# Patient Record
Sex: MALE | Race: WHITE | NOT HISPANIC OR LATINO | Employment: OTHER | URBAN - NONMETROPOLITAN AREA
[De-identification: names, ages, dates, MRNs, and addresses within clinical notes are randomized per-mention and may not be internally consistent; named-entity substitution may affect disease eponyms.]

---

## 2017-07-20 ENCOUNTER — OFFICE VISIT (OUTPATIENT)
Dept: PODIATRY | Facility: CLINIC | Age: 62
End: 2017-07-20

## 2017-07-20 VITALS — BODY MASS INDEX: 27.09 KG/M2 | WEIGHT: 200 LBS | HEIGHT: 72 IN

## 2017-07-20 DIAGNOSIS — M72.2 PLANTAR FASCIITIS: ICD-10-CM

## 2017-07-20 DIAGNOSIS — M24.573 EQUINUS CONTRACTURE OF ANKLE: ICD-10-CM

## 2017-07-20 DIAGNOSIS — M79.672 LEFT FOOT PAIN: Primary | ICD-10-CM

## 2017-07-20 PROCEDURE — 99203 OFFICE O/P NEW LOW 30 MIN: CPT | Performed by: PODIATRIST

## 2017-07-20 PROCEDURE — 20550 NJX 1 TENDON SHEATH/LIGAMENT: CPT | Performed by: PODIATRIST

## 2017-07-20 RX ORDER — MELOXICAM 15 MG/1
15 TABLET ORAL DAILY
Qty: 30 TABLET | Refills: 0 | Status: SHIPPED | OUTPATIENT
Start: 2017-07-20 | End: 2019-07-31

## 2017-07-20 RX ORDER — NAPROXEN 500 MG/1
TABLET ORAL
Refills: 11 | COMMUNITY
Start: 2017-07-10 | End: 2017-07-20

## 2017-07-20 RX ADMIN — BETAMETHASONE SODIUM PHOSPHATE AND BETAMETHASONE ACETATE 6 MG: 3; 3 INJECTION, SUSPENSION INTRA-ARTICULAR; INTRALESIONAL; INTRAMUSCULAR; SOFT TISSUE at 10:35

## 2017-07-20 NOTE — PROGRESS NOTES
"Brandin Jackson  1955  61 y.o. male  Patient presents today for left foot pain.    07/20/2017  Chief Complaint   Patient presents with   • Left Foot - Pain           History of Present Illness    Brandin Jackson is a 61 y.o. male who presents for evaluation of left plantar foot pain.  He states his pain is been present for several months ever since he underwent a left total knee arthroplasty.  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  He denies any trauma or injuries.  He describes the pain as sharp and worse with any kind of weightbearing.  He states he has been stretching it a little and taking naproxen.  He notes minimal relief of symptoms with these treatments.    No past medical history on file.      No past surgical history on file.      No family history on file.      Social History     Social History   • Marital status:      Spouse name: N/A   • Number of children: N/A   • Years of education: N/A     Occupational History   • Not on file.     Social History Main Topics   • Smoking status: Current Every Day Smoker   • Smokeless tobacco: Not on file   • Alcohol use No   • Drug use: Not on file   • Sexual activity: Not on file     Other Topics Concern   • Not on file     Social History Narrative   • No narrative on file         Current Outpatient Prescriptions   Medication Sig Dispense Refill   • meloxicam (MOBIC) 15 MG tablet Take 1 tablet by mouth Daily. Take once daily. 30 tablet 0     No current facility-administered medications for this visit.          OBJECTIVE    Ht 72\" (182.9 cm)  Wt 200 lb (90.7 kg)  BMI 27.12 kg/m2      Review of Systems   Constitutional:  Denies recent weight loss, weight gain, fever or chills, no change in exercise tolerance  Musculoskeletal: Arch/foot pain.   Skin: No wounds or lesions  Neurological: Denies paresthesias.  Psychiatric/Behavioral: Denies depression        Physical Exam   Constitutional: he appears " well-developed and well-nourished.   HEENT: Normocephalic. Atraumatic.  CV: No CP. RRR  Resp: Non-labored respirations.  Psychiatric: he has a normal mood and affect. his behavior is normal.         Lower Extremity Exam:  Vascular: DP/PT pulses palpable 2+.   Minimal plantar heel edema, left  Foot warm  Neuro: Protective sensation intact, b/l.  DTRs intact  Negative Tinel over tarsal tunnel  Integument: No open wounds or lesions.  No erythema, scaling  No masses  Musculoskeletal: LE muscle strength 5/5.   Gait normal  Ankle ROM decreased without pain or crepitus  STJ ROM full without pain or crepitus  Minimal pain on palpation of plantar calcaneal tubercles, left  Taut medial plantar fascial band noted with pain on palpation, left  No tenderness to lateral compression of calcaneus, left    Left foot radiographs- Normal osseous density. No acute fractures, subluxations or erosions.    Left heel injection:  Risks and benefits discussed. Written consent obtained.  Skin prepped with alcohol  Medial heel injection 1cc 0.5% Marcaine, 1cc Celestone with 27g needle  Band-aid applied.  Pt tolerated well.          ASSESSMENT AND PLAN    Brandin was seen today for pain.    Diagnoses and all orders for this visit:    Left foot pain  -     XR Foot 3+ View Left    Plantar fasciitis    Equinus contracture of ankle    Other orders  -     meloxicam (MOBIC) 15 MG tablet; Take 1 tablet by mouth Daily. Take once daily.    -Comprehensive foot and ankle exam performed  -Radiographs ordered and reviewed  -Educated pt on diagnosis, etiology and treatment of plantar fasciitis.  -Continue motion control shoe  -Recommend power step over-the-counter inserts  -Aggressive stretching regimen, patient education materials dispensed  -Rx Meloxicam 15 mg qday, not to be taken with other NSAIDs  -Corticosteroid injection as above  -Patient does have very taut plantar fascia on the left foot.  Did briefly discuss possibility of surgical release if  conservative therapies fail  -Recheck 4 weeks          This document has been electronically signed by Edwardo Garza DPM on July 23, 2017 1:51 PM     EMR Dragon/Transcription disclaimer:   Much of this encounter note is an electronic transcription/translation of spoken language to printed text. The electronic translation of spoken language may permit erroneous, or at times, nonsensical words or phrases to be inadvertently transcribed; Although I have reviewed the note for such errors, some may still exist.    Edwardo Garza DPM  7/23/2017  1:51 PM

## 2017-07-26 RX ORDER — BETAMETHASONE SODIUM PHOSPHATE AND BETAMETHASONE ACETATE 3; 3 MG/ML; MG/ML
6 INJECTION, SUSPENSION INTRA-ARTICULAR; INTRALESIONAL; INTRAMUSCULAR; SOFT TISSUE ONCE
Status: COMPLETED | OUTPATIENT
Start: 2017-07-26 | End: 2017-07-20

## 2018-02-07 ENCOUNTER — TRANSCRIBE ORDERS (OUTPATIENT)
Dept: PHYSICAL THERAPY | Facility: HOSPITAL | Age: 63
End: 2018-02-07

## 2018-02-07 DIAGNOSIS — Z96.651 PRESENCE OF RIGHT ARTIFICIAL KNEE JOINT: ICD-10-CM

## 2018-02-07 DIAGNOSIS — M17.11 ARTHRITIS OF RIGHT KNEE: Primary | ICD-10-CM

## 2018-02-08 ENCOUNTER — DOCUMENTATION (OUTPATIENT)
Dept: PHYSICAL THERAPY | Facility: HOSPITAL | Age: 63
End: 2018-02-08

## 2018-02-08 ENCOUNTER — HOSPITAL ENCOUNTER (OUTPATIENT)
Dept: PHYSICAL THERAPY | Facility: HOSPITAL | Age: 63
Setting detail: THERAPIES SERIES
Discharge: HOME | End: 2018-02-08

## 2018-02-08 DIAGNOSIS — Z96.651 S/P TOTAL KNEE ARTHROPLASTY, RIGHT: ICD-10-CM

## 2018-02-08 DIAGNOSIS — M17.11 PRIMARY OSTEOARTHRITIS OF RIGHT KNEE: Primary | ICD-10-CM

## 2018-02-08 PROCEDURE — G0283 ELEC STIM OTHER THAN WOUND: HCPCS | Performed by: PHYSICAL THERAPIST

## 2018-02-08 PROCEDURE — 97162 PT EVAL MOD COMPLEX 30 MIN: CPT | Performed by: PHYSICAL THERAPIST

## 2018-02-08 PROCEDURE — 97110 THERAPEUTIC EXERCISES: CPT | Performed by: PHYSICAL THERAPIST

## 2018-02-08 NOTE — THERAPY EVALUATION
"    Outpatient Physical Therapy Ortho Initial Evaluation  [facility]     Patient Name: Brandin Jackson  : 1955  MRN: [id number]  Today's Date: 2018      Visit Date: 2018  Attendance:  (60 visits/yr)  Subjective Improvement: n/a  Next MD Appt: next week  Recert Date: 3/1/18    Therapy Diagnosis: R TKA 18         No past medical history on file.     Past Surgical History:   Procedure Laterality Date   • KIDNEY STONE SURGERY     • TOTAL KNEE ARTHROPLASTY Left 2017       Current Outpatient Prescriptions:  · Percocet  · Eliquis  · Colace  · Toradol  · Oxycontin        Allergies   Allergen Reactions   • Penicillins Swelling     Itching         Visit Dx:     ICD-10-CM ICD-9-CM   1. Primary osteoarthritis of right knee M17.11 715.16   2. S/P total knee arthroplasty, right Z96.651 V43.65             Patient History       18 0900          History    Chief Complaint Difficulty Walking;Difficulty with daily activities;Muscle weakness;Pain  -SS      Type of Pain Knee pain   right  -SS      Date Current Problem(s) Began 18   chronic knee pain  -SS      Brief Description of Current Complaint Patient underwent R TKA on 18. He has history of left TKA in 2017. He reports that the right knee is feeling better than he did with the left.  male with children. Lives in single story house with 6-8 steps to enter and none inside. Able to ascend and descent the stairs one at time.  -SS      Patient/Caregiver Goals Return to work;Improve strength   \"Have quality of life again\"  -      Current Tobacco Use smoker  -      Smoking Status 1 ppd  -      Patient's Rating of General Health Very good  -      Occupation/sports/leisure activities [employer], off work since 2/3/18. Hobbies: sports, hunting, fishing, spending time with family, travel  -      What clinical tests have you had for this problem? --   none since surgery  -SS      Surgery Date 1 " 02/06/18  -SS      Pain     Pain Location Knee   right  -SS      Pain at Present 2  -SS      Pain at Best 0   since surgery  -SS      Pain at Worst 6   since surgery  -SS      Pain Frequency Intermittent  -SS      Pain Description Aching  -SS      What Performance Factors Make the Current Problem(s) WORSE? none noted at present  -SS      What Performance Factors Make the Current Problem(s) BETTER? rest, medications  -SS      Is your sleep disturbed? Yes  -SS      Is medication used to assist with sleep? No  -SS      Difficulties at work? off work  -SS      Difficulties with ADL's? decreased  -SS      Difficulties with recreational activities? decreased  -SS      Fall Risk Assessment    Any falls in the past year: No  -SS      Does patient have a fear of falling No  -SS      Daily Activities    Primary Language English  -SS      Safety    Are you being hurt, hit, or frightened by anyone at home or in your life? No  -SS      Are you being neglected by a caregiver No  -SS        User Key  (r) = Recorded By, (t) = Taken By, (c) = Cosigned By    Initials Name Provider Type    SS Louie Hong, PT DPT Physical Therapist                PT Ortho       02/08/18 0900    Subjective Comments    Subjective Comments see Therapy Patient History  -SS    Precautions and Contraindications    Precautions/Limitations no known precautions/limitations  -SS    Precautions 2 days s/p R TKA  -SS    Contraindications none noted  -SS    Subjective Pain    Able to rate subjective pain? yes  -SS    Pre-Treatment Pain Level 2  -SS    Post-Treatment Pain Level 2  -SS    Posture/Observations    Observations Edema;Erythema   right knee  -SS    Posture/Observations Comments Presents with an immobilizer on R LE and ambulating with standard walker. After removal of bandage, there a stapled incision in the anterior knee.  -SS    Quarter Clearing    Quarter Clearing --   LE sensation intact  -SS    Right Knee    Extension/Flexion AROM Deficit  0-5-55  -SS    Lower Extremity    Lower Ext Manual Muscle Testing Detail unable to perform an independent SLR  -    Left Knee    Knee Manual Muscle Testing Detail MMT deferred this date.  -      User Key  (r) = Recorded By, (t) = Taken By, (c) = Cosigned By    Initials Name Provider Type     Louie Hong, PT DPT Physical Therapist                      Therapy Education  Given: HEP  Program: New  How Provided: Verbal  Provided to: Patient  Level of Understanding: Verbalized           PT OP Goals       02/08/18 0900       PT Short Term Goals    STG Date to Achieve 03/01/18  -     STG 1 Note a >/= 50% subjective improvement.  -     STG 2 LEFS score to be >/= 30/80  -SS     STG 3 Right knee extension AROM to 0 deg  -SS     STG 4 R knee flexion AROM to >/=100 deg  -SS     STG 5 Ambulate household distances with STC  -     Long Term Goals    LTG Date to Achieve 03/22/18  -SS     LTG 1 Independent with HEP.   -     LTG 2 LEFS score to be >/= 50/80  -     LTG 3 Right knee active flexion to >/= 120 deg  -     LTG 4 Ambulate community distances without assistive device  -     LTG 5 R hip and knee strength 5/5  -SS     Time Calculation    PT Goal Re-Cert Due Date 03/01/18  -       User Key  (r) = Recorded By, (t) = Taken By, (c) = Cosigned By    Initials Name Provider Type     Louie Hong, PT DPT Physical Therapist                PT Assessment/Plan       02/08/18 0900       PT Assessment    Functional Limitations Impaired gait;Limitation in home management;Limitations in community activities;Performance in self-care ADL;Performance in work activities  -     Impairments Endurance;Gait;Edema;Muscle strength;Pain;Range of motion  -     Assessment Comments 2 days s/p R TKA. Pain noted with HS stretch. Recommended heel propping especially if sits in recliner for prolonged periods.  -     Rehab Potential Excellent  -     Patient/caregiver participated in establishment of treatment  plan and goals Yes  -SS     Patient would benefit from skilled therapy intervention Yes  -SS     PT Plan    PT Frequency 2x/week  -SS     Predicted Duration of Therapy Intervention (days/wks) 4-6 weeks  -     PT Plan Comments ROM, stretching, strengthening, gait training, balance training, IFC estim, ice  -SS       User Key  (r) = Recorded By, (t) = Taken By, (c) = Cosigned By    Initials Name Provider Type     Louie Hong, PT DPT Physical Therapist                Modalities       02/08/18 0900          Ice    Ice Applied Yes  -SS      Location R knee concurrent with IFC estim  -SS      Rx Minutes Other:   20 mins  -SS      Ice Prior to Rx No  -SS      Ice S/P Rx Yes  -SS      ELECTRICAL STIMULATION    Attended/Unattended Unattended  -SS      Stimulation Type IFC  -SS      Location/Electrode Placement/Other R knee concurrent with ice  -SS      Rx Minutes 20 mins  -SS        User Key  (r) = Recorded By, (t) = Taken By, (c) = Cosigned By    Initials Name Provider Type     Louie Hong, PT DPT Physical Therapist              Exercises       02/08/18 0900          Subjective Comments    Subjective Comments see Therapy Patient History  -SS      Subjective Pain    Able to rate subjective pain? yes  -SS      Pre-Treatment Pain Level 2  -SS      Post-Treatment Pain Level 2  -SS      Aquatics    Aquatics performed? No  -SS      Exercise 1    Exercise Name 1 Pro2, Seat 14, rock for ROM  -SS      Cueing 1 Verbal  -SS      Time (Minutes) 1 10 mins  -SS      Additional Comments Level 1  -SS      Exercise 2    Exercise Name 2 Standing R HS stretch  -SS      Cueing 2 Verbal;Demo  -SS      Sets 2 3  -SS      Time (Seconds) 2 30 sec hold  -SS      Exercise 3    Exercise Name 3 Lunge stretch for knee flexon  -SS      Cueing 3 Verbal  -SS      Sets 3 3  -SS      Time (Seconds) 3 30 sec hold  -SS      Exercise 4    Exercise Name 4 Quad sets  -SS      Cueing 4 Verbal  -SS      Sets 4 1  -SS      Reps 4 10  -SS       Time (Seconds) 4 5 sec hold  -SS        User Key  (r) = Recorded By, (t) = Taken By, (c) = Cosigned By    Initials Name Provider Type    TRISH Hong PT DPT Physical Therapist                        Outcome Measure Options: Lower Extremity Functional Scale (LEFS)  Lower Extremity Functional Index  Any of your usual work, housework or school activities: Extreme difficulty or unable to perform activity  Your usual hobbies, recreational or sporting activities: Extreme difficulty or unable to perform activity  Getting into or out of the bath: Extreme difficulty or unable to perform activity  Walking between rooms: Quite a bit of difficulty  Putting on your shoes or socks: Quite a bit of difficulty  Squatting: Extreme difficulty or unable to perform activity  Lifting an object, like a bag of groceries from the floor: Quite a bit of difficulty  Performing light activities around your home: Quite a bit of difficulty  Performing heavy activities around your home: Extreme difficulty or unable to perform activity  Getting into or out of a car: Extreme difficulty or unable to perform activity  Walking 2 blocks: Extreme difficulty or unable to perform activity  Walking a mile: Extreme difficulty or unable to perform activity  Going up or down 10 stairs (about 1 flight of stairs): Extreme difficulty or unable to perform activity  Standing for 1 hour: Quite a bit of difficulty  Sitting for 1 hour: A little bit of difficulty  Running on even ground: Extreme difficulty or unable to perform activity  Running on uneven ground: Extreme difficulty or unable to perform activity  Making sharp turns while running fast: Extreme difficulty or unable to perform activity  Hopping: Extreme difficulty or unable to perform activity  Rolling over in bed: Quite a bit of difficulty  Total: 9      Time Calculation:   Start Time: 0931  Stop Time: 1045  Time Calculation (min): 74 min     Therapy Charges for Today     Code Description  Service Date Service Provider Modifiers Qty    41735507134 HC PT EVAL MOD COMPLEXITY 3 2/8/2018 Louie Hong, PT DPT GP 1    20021615810 HC PT THER PROC EA 15 MIN 2/8/2018 Louie Hong, PT DPT GP 1    68274952036 HC PT ELECTRICAL STIM UNATTENDED 2/8/2018 Louie Hong, PT DPT  1    19725347686 HC PT THER SUPP EA 15 MIN 2/8/2018 Louie Hong, PT DPT GP 1                   Louie Hong, PT, DPT, CHT  2/8/2018

## 2018-02-08 NOTE — THERAPY DISCHARGE NOTE
Outpatient Physical Therapy Ortho Discharge Summary       Patient Name: Brandin Jackson  : 1955  MRN: [id number]  Today's Date: 2018      Visit Date: 2018    Visit Dx:    ICD-10-CM ICD-9-CM   1. Primary osteoarthritis of right knee M17.11 715.16   2. S/P total knee arthroplasty, right Z96.651 V43.65                   Patient's spouse called to cancel all appointments. Director (Callie Sosa, PT) called to check on patient. Patient states that he wishes to be treated at a different therapy clinic, which is the one that worked with him after his last TKA.                                             OP PT Discharge Summary  Date of Discharge: 18  Reason for Discharge: Patient/Caregiver request, other (comment) (patient will be going to a competing therapy clinic)  Outcomes Achieved: Discharge from facility occurred on same date as evaluation  Discharge Destination: Other (comment) ([facility])      Louie Hong, PT DPT  2018

## 2018-02-12 ENCOUNTER — APPOINTMENT (OUTPATIENT)
Dept: PHYSICAL THERAPY | Facility: HOSPITAL | Age: 63
End: 2018-02-12

## 2018-02-14 ENCOUNTER — APPOINTMENT (OUTPATIENT)
Dept: PHYSICAL THERAPY | Facility: HOSPITAL | Age: 63
End: 2018-02-14

## 2019-07-31 ENCOUNTER — OFFICE VISIT (OUTPATIENT)
Dept: FAMILY MEDICINE CLINIC | Facility: CLINIC | Age: 64
End: 2019-07-31

## 2019-07-31 ENCOUNTER — APPOINTMENT (OUTPATIENT)
Dept: LAB | Facility: HOSPITAL | Age: 64
End: 2019-07-31

## 2019-07-31 VITALS
WEIGHT: 205.9 LBS | SYSTOLIC BLOOD PRESSURE: 145 MMHG | BODY MASS INDEX: 27.89 KG/M2 | HEIGHT: 72 IN | DIASTOLIC BLOOD PRESSURE: 98 MMHG

## 2019-07-31 DIAGNOSIS — Z11.59 ENCOUNTER FOR HEPATITIS C SCREENING TEST FOR LOW RISK PATIENT: ICD-10-CM

## 2019-07-31 DIAGNOSIS — Z23 NEED FOR VACCINATION: ICD-10-CM

## 2019-07-31 DIAGNOSIS — Z72.0 TOBACCO USE: Primary | ICD-10-CM

## 2019-07-31 DIAGNOSIS — R03.0 ELEVATED BLOOD PRESSURE READING: ICD-10-CM

## 2019-07-31 DIAGNOSIS — Z13.220 SCREENING CHOLESTEROL LEVEL: ICD-10-CM

## 2019-07-31 DIAGNOSIS — F51.02 ADJUSTMENT INSOMNIA: ICD-10-CM

## 2019-07-31 PROBLEM — L57.8 SUN-DAMAGED SKIN: Status: ACTIVE | Noted: 2019-07-31

## 2019-07-31 PROBLEM — L57.8 SUN-DAMAGED SKIN: Chronic | Status: ACTIVE | Noted: 2019-07-31

## 2019-07-31 PROCEDURE — 90471 IMMUNIZATION ADMIN: CPT | Performed by: FAMILY MEDICINE

## 2019-07-31 PROCEDURE — 83735 ASSAY OF MAGNESIUM: CPT | Performed by: FAMILY MEDICINE

## 2019-07-31 PROCEDURE — 84439 ASSAY OF FREE THYROXINE: CPT | Performed by: FAMILY MEDICINE

## 2019-07-31 PROCEDURE — 99203 OFFICE O/P NEW LOW 30 MIN: CPT | Performed by: FAMILY MEDICINE

## 2019-07-31 PROCEDURE — 90670 PCV13 VACCINE IM: CPT | Performed by: FAMILY MEDICINE

## 2019-07-31 PROCEDURE — 80061 LIPID PANEL: CPT | Performed by: FAMILY MEDICINE

## 2019-07-31 PROCEDURE — 84443 ASSAY THYROID STIM HORMONE: CPT | Performed by: FAMILY MEDICINE

## 2019-07-31 PROCEDURE — 86803 HEPATITIS C AB TEST: CPT | Performed by: FAMILY MEDICINE

## 2019-07-31 PROCEDURE — 93010 ELECTROCARDIOGRAM REPORT: CPT | Performed by: INTERNAL MEDICINE

## 2019-07-31 PROCEDURE — 93005 ELECTROCARDIOGRAM TRACING: CPT | Performed by: FAMILY MEDICINE

## 2019-07-31 PROCEDURE — 80053 COMPREHEN METABOLIC PANEL: CPT | Performed by: FAMILY MEDICINE

## 2019-07-31 PROCEDURE — 36415 COLL VENOUS BLD VENIPUNCTURE: CPT | Performed by: FAMILY MEDICINE

## 2019-07-31 RX ORDER — TRAZODONE HYDROCHLORIDE 50 MG/1
TABLET ORAL
Qty: 40 TABLET | Refills: 3 | Status: SHIPPED | OUTPATIENT
Start: 2019-07-31 | End: 2019-08-12 | Stop reason: SDUPTHER

## 2019-07-31 NOTE — PROGRESS NOTES
Subjective   Brandin Jackson is a 63 y.o. male.     History of Present Illness    requesting evaluation of elevated blood pressure and insomnia.  Patient states that elevated pressure off and on for some time they were 150/90 range.  Has never been on any medication for same.  Also states has insomnia which is more adjustment related.  Has happened 3 AM to go to work does not sleep well.  He does not snore however.  Past history is significant for bilateral knee replacements.  Also has had a history of renal stone removal.  Does smoke about half a pack a day and has cut down from previous restraints rarely.  Does a great deal of work.  Is also overexposed his skin to the son has moderate to severe sun damage.  Has had no screenings etc.  History and sidebar generated.    The following portions of the patient's history were reviewed and updated as appropriate: allergies, current medications, past family history, past medical history, past social history, past surgical history and problem list.    Review of Systems   Constitutional: Negative for activity change, appetite change, fatigue and unexpected weight change.   HENT: Negative for trouble swallowing and voice change.    Eyes: Negative for redness and visual disturbance.   Respiratory: Negative for cough and wheezing.    Cardiovascular: Negative for chest pain and palpitations.   Gastrointestinal: Negative for abdominal pain, constipation, diarrhea, nausea and vomiting.   Genitourinary: Negative for urgency.   Musculoskeletal: Negative for joint swelling.   Neurological: Negative for syncope and headaches.   Hematological: Negative for adenopathy.   Psychiatric/Behavioral: Positive for sleep disturbance.       Objective   Physical Exam   Constitutional: He is oriented to person, place, and time. He appears well-developed.   HENT:   Head: Normocephalic.   Right Ear: External ear normal.   Left Ear: External ear normal.   Nose: Nose normal.   Mouth/Throat:  Oropharynx is clear and moist.   Mild smoker's erythema no lesion dentition adequate to poor   Eyes: Pupils are equal, round, and reactive to light.   Neck: Normal range of motion. No thyromegaly present.   Cardiovascular: Normal rate, regular rhythm, normal heart sounds and intact distal pulses. Exam reveals no gallop and no friction rub.   No murmur heard.  Pulmonary/Chest: Breath sounds normal.   Lipoma over xiphoid otherwise negative   Abdominal: Soft. He exhibits no distension and no mass. There is no tenderness.   Musculoskeletal: Normal range of motion.   Neurological: He is alert and oriented to person, place, and time. He has normal reflexes.   Skin: Skin is warm and dry.   Barely over tanned lentigo noted no acute lesions   Psychiatric: He has a normal mood and affect. His speech is normal and behavior is normal.       Assessment/Plan   Brandin was seen today for establish care and insomnia.    Diagnoses and all orders for this visit:    Tobacco use  -     ECG 12 Lead  -     XR Chest 2 View    Elevated blood pressure reading  -     ECG 12 Lead  -     Magnesium  -     Comprehensive Metabolic Panel  -     T4, Free  -     TSH  -     XR Chest 2 View    Screening cholesterol level  -     Lipid Panel With LDL / HDL Ratio    Encounter for hepatitis C screening test for low risk patient  -     Hepatitis C Antibody    Adjustment insomnia  -     traZODone (DESYREL) 50 MG tablet; 1qhs x5 days then 2qhs for sleep till seen again    Need for vaccination  -     Pneumococcal Conjugate Vaccine 13-Valent All      Suspect true element of benign essential hypertension multiple factors for cardiovascular disease although no other symptoms.  Start off with screening labs above trial of trazodone at night but counseled on possible need for behavioral counseling later recheck of 7 to 10 days.  Start pneumonia series due to smoking.  Defer other till blood pressure improved

## 2019-08-01 LAB
ALBUMIN SERPL-MCNC: 4.2 G/DL (ref 3.5–5.2)
ALBUMIN/GLOB SERPL: 1.8 G/DL
ALP SERPL-CCNC: 119 U/L (ref 39–117)
ALT SERPL W P-5'-P-CCNC: 20 U/L (ref 1–41)
ANION GAP SERPL CALCULATED.3IONS-SCNC: 10.5 MMOL/L (ref 5–15)
AST SERPL-CCNC: 19 U/L (ref 1–40)
BILIRUB SERPL-MCNC: 0.3 MG/DL (ref 0.2–1.2)
BUN BLD-MCNC: 12 MG/DL (ref 8–23)
BUN/CREAT SERPL: 12 (ref 7–25)
CALCIUM SPEC-SCNC: 9.7 MG/DL (ref 8.6–10.5)
CHLORIDE SERPL-SCNC: 105 MMOL/L (ref 98–107)
CHOLEST SERPL-MCNC: 121 MG/DL (ref 0–200)
CO2 SERPL-SCNC: 24.5 MMOL/L (ref 22–29)
CREAT BLD-MCNC: 1 MG/DL (ref 0.76–1.27)
GFR SERPL CREATININE-BSD FRML MDRD: 76 ML/MIN/1.73
GLOBULIN UR ELPH-MCNC: 2.3 GM/DL
GLUCOSE BLD-MCNC: 89 MG/DL (ref 65–99)
HCV AB SER DONR QL: NORMAL
HDLC SERPL-MCNC: 42 MG/DL (ref 40–60)
LDLC SERPL CALC-MCNC: 59 MG/DL (ref 0–100)
LDLC/HDLC SERPL: 1.41 {RATIO}
MAGNESIUM SERPL-MCNC: 2.6 MG/DL (ref 1.6–2.4)
POTASSIUM BLD-SCNC: 4.2 MMOL/L (ref 3.5–5.2)
PROT SERPL-MCNC: 6.5 G/DL (ref 6–8.5)
SODIUM BLD-SCNC: 140 MMOL/L (ref 136–145)
T4 FREE SERPL-MCNC: 1.44 NG/DL (ref 0.93–1.7)
TRIGL SERPL-MCNC: 99 MG/DL (ref 0–150)
TSH SERPL DL<=0.05 MIU/L-ACNC: 1.37 MIU/ML (ref 0.27–4.2)
VLDLC SERPL-MCNC: 19.8 MG/DL (ref 5–40)

## 2019-08-01 RX ORDER — NICOTINE 21 MG/24HR
PATCH, TRANSDERMAL 24 HOURS TRANSDERMAL
Qty: 28 PATCH | Refills: 0 | Status: SHIPPED | OUTPATIENT
Start: 2019-08-01 | End: 2021-02-24

## 2019-08-12 ENCOUNTER — OFFICE VISIT (OUTPATIENT)
Dept: FAMILY MEDICINE CLINIC | Facility: CLINIC | Age: 64
End: 2019-08-12

## 2019-08-12 VITALS
WEIGHT: 202 LBS | SYSTOLIC BLOOD PRESSURE: 144 MMHG | BODY MASS INDEX: 27.36 KG/M2 | HEIGHT: 72 IN | DIASTOLIC BLOOD PRESSURE: 96 MMHG

## 2019-08-12 DIAGNOSIS — Z12.11 SCREEN FOR COLON CANCER: ICD-10-CM

## 2019-08-12 DIAGNOSIS — I10 ESSENTIAL HYPERTENSION: ICD-10-CM

## 2019-08-12 DIAGNOSIS — F51.02 ADJUSTMENT INSOMNIA: Primary | Chronic | ICD-10-CM

## 2019-08-12 PROBLEM — R03.0 ELEVATED BLOOD PRESSURE READING: Chronic | Status: RESOLVED | Noted: 2019-07-31 | Resolved: 2019-08-12

## 2019-08-12 PROCEDURE — 99213 OFFICE O/P EST LOW 20 MIN: CPT | Performed by: FAMILY MEDICINE

## 2019-08-12 RX ORDER — TRAZODONE HYDROCHLORIDE 150 MG/1
TABLET ORAL
Qty: 90 TABLET | Refills: 1 | Status: SHIPPED | OUTPATIENT
Start: 2019-08-12 | End: 2020-02-10

## 2019-08-12 RX ORDER — OLMESARTAN MEDOXOMIL 20 MG/1
20 TABLET ORAL DAILY
Qty: 30 TABLET | Refills: 1 | Status: SHIPPED | OUTPATIENT
Start: 2019-08-12 | End: 2019-09-09 | Stop reason: SDUPTHER

## 2019-08-12 NOTE — PROGRESS NOTES
Subjective   Brandin Jackson is a 63 y.o. male.     History of Present Illness   reevaluation elevated blood pressure over tanned skin tobacco abuse adjustment insomnia.  In the interim the trazodone seems to be helping slightly.  We are going to increase dose to standard dose next pill bottle.  Lab work within normal limits.  Suspect hypertension is true benign essential.  Has lost 3 pounds watching diet.  Is past due for colonoscopy.  Declines shingles vaccine at present.  Gets flu vaccine at work.    The following portions of the patient's history were reviewed and updated as appropriate: allergies, current medications, past family history, past medical history, past social history, past surgical history and problem list.    Review of Systems   Constitutional: Negative for activity change, appetite change, fatigue and unexpected weight change.   HENT: Negative for trouble swallowing and voice change.    Eyes: Negative for redness and visual disturbance.   Respiratory: Negative for cough and wheezing.    Cardiovascular: Negative for chest pain and palpitations.   Gastrointestinal: Negative for abdominal pain, constipation, diarrhea, nausea and vomiting.   Genitourinary: Negative for urgency.   Musculoskeletal: Negative for joint swelling.   Neurological: Negative for syncope and headaches.   Hematological: Negative for adenopathy.   Psychiatric/Behavioral: Positive for sleep disturbance.       Objective   Physical Exam   Constitutional: He is oriented to person, place, and time. He appears well-developed.   HENT:   Head: Normocephalic.   Right Ear: External ear normal.   Nose: Nose normal.   Eyes: Pupils are equal, round, and reactive to light.   Neck: Normal range of motion. No thyromegaly present.   Cardiovascular: Normal rate, regular rhythm and intact distal pulses. Exam reveals no gallop and no friction rub.   No murmur heard.  Pulmonary/Chest: Breath sounds normal.   Abdominal: Soft. He exhibits no  distension and no mass. There is no tenderness.   Musculoskeletal: Normal range of motion.   Neurological: He is alert and oriented to person, place, and time. He has normal reflexes.   Skin: Skin is warm and dry.   Psychiatric: He has a normal mood and affect. His behavior is normal. Judgment and thought content normal.       Assessment/Plan   Brandin was seen today for follow-up.    Diagnoses and all orders for this visit:    Adjustment insomnia  -     traZODone (DESYREL) 150 MG tablet; 1 nightly for sleep void previous    Essential hypertension  -     olmesartan (BENICAR) 20 MG tablet; Take 1 tablet by mouth Daily. For bp till seen again    Screen for colon cancer  -     Ambulatory Referral For Screening Colonoscopy      Start to Benicar.  Can increase trazodone with next pill bottle.  Arrange for colonoscopy later date.  Recheck blood pressure 4 weeks

## 2019-09-09 ENCOUNTER — OFFICE VISIT (OUTPATIENT)
Dept: FAMILY MEDICINE CLINIC | Facility: CLINIC | Age: 64
End: 2019-09-09

## 2019-09-09 VITALS
SYSTOLIC BLOOD PRESSURE: 119 MMHG | BODY MASS INDEX: 27.5 KG/M2 | WEIGHT: 203 LBS | HEIGHT: 72 IN | DIASTOLIC BLOOD PRESSURE: 80 MMHG

## 2019-09-09 DIAGNOSIS — Z72.0 TOBACCO USE: Chronic | ICD-10-CM

## 2019-09-09 DIAGNOSIS — F51.02 ADJUSTMENT INSOMNIA: Chronic | ICD-10-CM

## 2019-09-09 DIAGNOSIS — I10 ESSENTIAL HYPERTENSION: Primary | Chronic | ICD-10-CM

## 2019-09-09 PROCEDURE — 99213 OFFICE O/P EST LOW 20 MIN: CPT | Performed by: FAMILY MEDICINE

## 2019-09-09 RX ORDER — OLMESARTAN MEDOXOMIL 20 MG/1
20 TABLET ORAL DAILY
Qty: 90 TABLET | Refills: 3 | Status: SHIPPED | OUTPATIENT
Start: 2019-09-09 | End: 2020-09-04

## 2019-09-09 RX ORDER — ASPIRIN 81 MG/1
81 TABLET ORAL DAILY
Qty: 90 TABLET | Refills: 3 | Status: SHIPPED | OUTPATIENT
Start: 2019-09-09 | End: 2020-09-04

## 2019-09-09 NOTE — PROGRESS NOTES
Subjective   Brandin Jackson is a 63 y.o. male.     History of Present Illness   reevaluation hypertension new onset treatment history of insomnia etc.  In the interim mouth both medicines working well blood pressure within normal limits sleeping better on trazodone.  Counseled today mainly on following up with referral for colonoscopy counseled again on stopping smoking sun exposure etc.  Declines vaccines at this point.  Will continue same and recheck in 6 months    The following portions of the patient's history were reviewed and updated as appropriate: allergies, current medications, past family history, past medical history, past social history, past surgical history and problem list.    Review of Systems   Constitutional: Negative for activity change, appetite change, fatigue and unexpected weight change.   HENT: Negative for trouble swallowing and voice change.    Eyes: Negative for redness and visual disturbance.   Respiratory: Negative for cough and wheezing.    Cardiovascular: Negative for chest pain and palpitations.   Gastrointestinal: Negative for abdominal pain, constipation, diarrhea, nausea and vomiting.   Genitourinary: Negative for urgency.   Musculoskeletal: Negative for joint swelling.   Neurological: Negative for syncope and headaches.   Hematological: Negative for adenopathy.   Psychiatric/Behavioral: Negative for sleep disturbance.       Objective   Physical Exam   Constitutional: He is oriented to person, place, and time. He appears well-developed.   HENT:   Head: Normocephalic.   Right Ear: External ear normal.   Nose: Nose normal.   Eyes: Pupils are equal, round, and reactive to light.   Neck: Normal range of motion. No thyromegaly present.   Cardiovascular: Normal rate, regular rhythm and intact distal pulses. Exam reveals no gallop and no friction rub.   No murmur heard.  Pulmonary/Chest: Breath sounds normal.   Abdominal: Soft. He exhibits no distension and no mass. There is no  tenderness.   Musculoskeletal: Normal range of motion.   Neurological: He is alert and oriented to person, place, and time. He has normal reflexes.   Skin: Skin is warm and dry.   Psychiatric: He has a normal mood and affect. His behavior is normal. Judgment and thought content normal.       Assessment/Plan   Brandin was seen today for follow-up.    Diagnoses and all orders for this visit:    Essential hypertension  -     olmesartan (BENICAR) 20 MG tablet; Take 1 tablet by mouth Daily. For bp till seen again    Adjustment insomnia    Tobacco use    Other orders  -     aspirin 81 MG EC tablet; Take 1 tablet by mouth Daily.        Plan as above

## 2020-02-10 DIAGNOSIS — F51.02 ADJUSTMENT INSOMNIA: Chronic | ICD-10-CM

## 2020-02-10 RX ORDER — TRAZODONE HYDROCHLORIDE 150 MG/1
TABLET ORAL
Qty: 90 TABLET | Refills: 1 | Status: SHIPPED | OUTPATIENT
Start: 2020-02-10 | End: 2020-08-07

## 2020-03-09 ENCOUNTER — OFFICE VISIT (OUTPATIENT)
Dept: FAMILY MEDICINE CLINIC | Facility: CLINIC | Age: 65
End: 2020-03-09

## 2020-03-09 VITALS
SYSTOLIC BLOOD PRESSURE: 128 MMHG | DIASTOLIC BLOOD PRESSURE: 80 MMHG | BODY MASS INDEX: 27.89 KG/M2 | HEIGHT: 72 IN | WEIGHT: 205.9 LBS

## 2020-03-09 DIAGNOSIS — M77.42 METATARSALGIA OF LEFT FOOT: ICD-10-CM

## 2020-03-09 DIAGNOSIS — M79.672 FOOT PAIN, LEFT: ICD-10-CM

## 2020-03-09 DIAGNOSIS — Z12.11 SCREEN FOR COLON CANCER: ICD-10-CM

## 2020-03-09 DIAGNOSIS — I10 ESSENTIAL HYPERTENSION: Primary | Chronic | ICD-10-CM

## 2020-03-09 DIAGNOSIS — N52.9 VASCULOGENIC ERECTILE DYSFUNCTION, UNSPECIFIED VASCULOGENIC ERECTILE DYSFUNCTION TYPE: ICD-10-CM

## 2020-03-09 DIAGNOSIS — F51.02 ADJUSTMENT INSOMNIA: Chronic | ICD-10-CM

## 2020-03-09 DIAGNOSIS — Z72.0 TOBACCO USE: Chronic | ICD-10-CM

## 2020-03-09 PROCEDURE — 99214 OFFICE O/P EST MOD 30 MIN: CPT | Performed by: FAMILY MEDICINE

## 2020-03-09 RX ORDER — SILDENAFIL 100 MG/1
TABLET, FILM COATED ORAL
Qty: 10 TABLET | Refills: 5 | Status: SHIPPED | OUTPATIENT
Start: 2020-03-09 | End: 2021-02-24 | Stop reason: SDUPTHER

## 2020-03-09 NOTE — PROGRESS NOTES
Subjective   Brandin Jackson is a 64 y.o. male.     History of Present Illness   reevaluation hypertension tobacco abuse adjustment insomnia.  In the interim using medications without problem.  States is down to 6 cigarettes a day.  Counseled continuing efforts to stop.  Has developed a left metatarsalgia over the past several months related to probably abnormal gait from knee replacement.  Pain anterior foot going midfoot etc.  Also requesting evaluation treatment of erectile dysfunction probably related to cigarette use.  Did not get colonoscopy done but will do a Cologuard.  Will be time for second pneumonia vaccine next visit.  History noted.  HPI    The following portions of the patient's history were reviewed and updated as appropriate: allergies, current medications, past family history, past medical history, past social history, past surgical history and problem list.    Review of Systems  Review of Systems   Constitutional: Negative for activity change, appetite change, fatigue and unexpected weight change.   HENT: Negative for trouble swallowing and voice change.    Eyes: Negative for redness and visual disturbance.   Respiratory: Negative for cough and wheezing.    Cardiovascular: Negative for chest pain and palpitations.   Gastrointestinal: Negative for abdominal pain, constipation, diarrhea, nausea and vomiting.   Genitourinary: Negative for urgency.   Musculoskeletal: Positive for arthralgias and gait problem (Related to foot). Negative for joint swelling.   Neurological: Negative for syncope and headaches.   Hematological: Negative for adenopathy.   Psychiatric/Behavioral: Positive for sleep disturbance (Controlled on medication).       Objective   Physical Exam  Physical Exam   Constitutional: He is oriented to person, place, and time. He appears well-developed.   HENT:   Head: Normocephalic.   Nose: Nose normal.   Eyes: Pupils are equal, round, and reactive to light.   Neck: Normal range of  "motion. No thyromegaly present.   Cardiovascular: Normal rate, regular rhythm, normal heart sounds and intact distal pulses. Exam reveals no gallop and no friction rub.   No murmur heard.  Pulmonary/Chest: Breath sounds normal.   Abdominal: Soft. He exhibits no distension and no mass. There is no tenderness.   Musculoskeletal: Normal range of motion.   No peripheral edema 2+ pulses left foot shows tender metatarsal pad going up into the arch anteriorly.  No deformity noted.  No Hood's sign.  Gait normal.   Neurological: He is alert and oriented to person, place, and time. He has normal reflexes.   Skin: Skin is warm and dry.   No breakdown   Psychiatric: He has a normal mood and affect. His speech is normal and behavior is normal.         Visit Vitals  /80   Ht 182.9 cm (72\")   Wt 93.4 kg (205 lb 14.4 oz)   BMI 27.93 kg/m²     Body mass index is 27.93 kg/m².      Assessment/Plan   Brandin was seen today for hypertension.    Diagnoses and all orders for this visit:    Essential hypertension  -     Comprehensive Metabolic Panel; Future  -     Magnesium; Future    Tobacco use    Adjustment insomnia    Foot pain, left    Metatarsalgia of left foot    Vasculogenic erectile dysfunction, unspecified vasculogenic erectile dysfunction type  -     Ambulatory Referral to Podiatry  -     sildenafil (VIAGRA) 100 MG tablet; Half to one qd prn    Screen for colon cancer  -     Cologuard - Stool, Per Rectum; Future     on stopping smoking.  3-10m     For the foot will probably need podiatry intervention regards to metatarsal irritation and weightbearing.  Discussed pros and cons of treatment of erectile dysfunction trial of Viagra.  Counseled on limitations of same.  We encouraged lifestyle changes.  Cologuard ordered.  Recheck 6 months with lab prior also be time for second pneumonia vaccine.  "

## 2020-03-20 DIAGNOSIS — Z12.11 SCREEN FOR COLON CANCER: ICD-10-CM

## 2020-06-15 ENCOUNTER — TELEPHONE (OUTPATIENT)
Dept: FAMILY MEDICINE CLINIC | Facility: CLINIC | Age: 65
End: 2020-06-15

## 2020-06-15 NOTE — TELEPHONE ENCOUNTER
Patient is in need of a referral to get covid testing prior to his scheduled specialist appointment in [place] on June 30th. Patient needs to also know where he would need to go to get the testing completed. Testing needs to be completed   1 WEEK PRIOR TO THE JUNE 30TH     Patient can be reached at [phone]

## 2020-08-07 DIAGNOSIS — F51.02 ADJUSTMENT INSOMNIA: Chronic | ICD-10-CM

## 2020-08-07 RX ORDER — TRAZODONE HYDROCHLORIDE 150 MG/1
TABLET ORAL
Qty: 90 TABLET | Refills: 1 | Status: SHIPPED | OUTPATIENT
Start: 2020-08-07 | End: 2021-02-24 | Stop reason: SDUPTHER

## 2020-09-04 DIAGNOSIS — I10 ESSENTIAL HYPERTENSION: Chronic | ICD-10-CM

## 2020-09-04 RX ORDER — ASPIRIN 81 MG/1
TABLET ORAL
Qty: 90 TABLET | Refills: 3 | Status: SHIPPED | OUTPATIENT
Start: 2020-09-04 | End: 2021-08-09

## 2020-09-04 RX ORDER — OLMESARTAN MEDOXOMIL 20 MG/1
TABLET ORAL
Qty: 90 TABLET | Refills: 3 | Status: SHIPPED | OUTPATIENT
Start: 2020-09-04 | End: 2021-02-24 | Stop reason: SDUPTHER

## 2021-02-05 DIAGNOSIS — F51.02 ADJUSTMENT INSOMNIA: Chronic | ICD-10-CM

## 2021-02-05 RX ORDER — TRAZODONE HYDROCHLORIDE 150 MG/1
TABLET ORAL
Qty: 30 TABLET | Refills: 1 | OUTPATIENT
Start: 2021-02-05

## 2021-02-24 ENCOUNTER — OFFICE VISIT (OUTPATIENT)
Dept: FAMILY MEDICINE CLINIC | Facility: CLINIC | Age: 66
End: 2021-02-24

## 2021-02-24 ENCOUNTER — LAB (OUTPATIENT)
Dept: LAB | Facility: HOSPITAL | Age: 66
End: 2021-02-24

## 2021-02-24 VITALS
SYSTOLIC BLOOD PRESSURE: 118 MMHG | BODY MASS INDEX: 27.66 KG/M2 | HEIGHT: 72 IN | DIASTOLIC BLOOD PRESSURE: 80 MMHG | WEIGHT: 204.2 LBS

## 2021-02-24 DIAGNOSIS — Z12.5 SCREENING FOR MALIGNANT NEOPLASM OF PROSTATE: ICD-10-CM

## 2021-02-24 DIAGNOSIS — I10 ESSENTIAL HYPERTENSION: Primary | Chronic | ICD-10-CM

## 2021-02-24 DIAGNOSIS — N52.9 VASCULOGENIC ERECTILE DYSFUNCTION, UNSPECIFIED VASCULOGENIC ERECTILE DYSFUNCTION TYPE: Chronic | ICD-10-CM

## 2021-02-24 DIAGNOSIS — Z23 NEED FOR VACCINATION: ICD-10-CM

## 2021-02-24 DIAGNOSIS — F51.02 ADJUSTMENT INSOMNIA: Chronic | ICD-10-CM

## 2021-02-24 DIAGNOSIS — I10 ESSENTIAL HYPERTENSION: Chronic | ICD-10-CM

## 2021-02-24 PROBLEM — Z72.0 TOBACCO USE: Chronic | Status: RESOLVED | Noted: 2019-07-31 | Resolved: 2021-02-24

## 2021-02-24 LAB
ALBUMIN SERPL-MCNC: 4.7 G/DL (ref 3.5–5.2)
ALBUMIN/GLOB SERPL: 1.9 G/DL
ALP SERPL-CCNC: 114 U/L (ref 39–117)
ALT SERPL W P-5'-P-CCNC: 24 U/L (ref 1–41)
ANION GAP SERPL CALCULATED.3IONS-SCNC: 10.8 MMOL/L (ref 5–15)
AST SERPL-CCNC: 20 U/L (ref 1–40)
BILIRUB SERPL-MCNC: 0.3 MG/DL (ref 0–1.2)
BUN SERPL-MCNC: 14 MG/DL (ref 8–23)
BUN/CREAT SERPL: 15.6 (ref 7–25)
CALCIUM SPEC-SCNC: 9 MG/DL (ref 8.6–10.5)
CHLORIDE SERPL-SCNC: 107 MMOL/L (ref 98–107)
CO2 SERPL-SCNC: 21.2 MMOL/L (ref 22–29)
CREAT SERPL-MCNC: 0.9 MG/DL (ref 0.76–1.27)
GFR SERPL CREATININE-BSD FRML MDRD: 85 ML/MIN/1.73
GLOBULIN UR ELPH-MCNC: 2.5 GM/DL
GLUCOSE SERPL-MCNC: 83 MG/DL (ref 65–99)
MAGNESIUM SERPL-MCNC: 2.2 MG/DL (ref 1.6–2.4)
POTASSIUM SERPL-SCNC: 4.3 MMOL/L (ref 3.5–5.2)
PROT SERPL-MCNC: 7.2 G/DL (ref 6–8.5)
PSA SERPL-MCNC: 4.63 NG/ML (ref 0–4)
SODIUM SERPL-SCNC: 139 MMOL/L (ref 136–145)

## 2021-02-24 PROCEDURE — 99214 OFFICE O/P EST MOD 30 MIN: CPT | Performed by: FAMILY MEDICINE

## 2021-02-24 PROCEDURE — 36415 COLL VENOUS BLD VENIPUNCTURE: CPT

## 2021-02-24 PROCEDURE — 80053 COMPREHEN METABOLIC PANEL: CPT

## 2021-02-24 PROCEDURE — G0103 PSA SCREENING: HCPCS | Performed by: FAMILY MEDICINE

## 2021-02-24 PROCEDURE — 90471 IMMUNIZATION ADMIN: CPT | Performed by: FAMILY MEDICINE

## 2021-02-24 PROCEDURE — 83735 ASSAY OF MAGNESIUM: CPT

## 2021-02-24 PROCEDURE — 90732 PPSV23 VACC 2 YRS+ SUBQ/IM: CPT | Performed by: FAMILY MEDICINE

## 2021-02-24 RX ORDER — SILDENAFIL 100 MG/1
TABLET, FILM COATED ORAL
Qty: 10 TABLET | Refills: 5 | Status: SHIPPED | OUTPATIENT
Start: 2021-02-24 | End: 2021-08-09

## 2021-02-24 RX ORDER — TRAZODONE HYDROCHLORIDE 100 MG/1
TABLET ORAL
Qty: 180 TABLET | Refills: 3 | Status: SHIPPED | OUTPATIENT
Start: 2021-02-24 | End: 2022-02-24 | Stop reason: SDUPTHER

## 2021-02-24 RX ORDER — OLMESARTAN MEDOXOMIL 20 MG/1
20 TABLET ORAL DAILY
Qty: 90 TABLET | Refills: 3 | Status: SHIPPED | OUTPATIENT
Start: 2021-02-24 | End: 2021-08-09

## 2021-02-24 NOTE — PROGRESS NOTES
Subjective   Brandin Jackson is a 65 y.o. male.  Valuation hypertension insomnia primary tobacco abuse sun damage skin etc.  In the interim is quit smoking in December has not smoked since.  Is also going through divorce.  Stay still having some sleep disturbance waking up in the middle the night suspect related to the need to adjust trazodone dose.  Other medicines have remained the same.  We do need to update pneumonia vaccine.  Overall feels better since stopping smoking.  Up-to-date on all other.  Time for lab work.    History of Present Illness   HPI    The following portions of the patient's history were reviewed and updated as appropriate: allergies, current medications, past family history, past medical history, past social history, past surgical history and problem list.    Review of Systems  Review of Systems   Constitutional: Negative for activity change, appetite change, fatigue and unexpected weight change.   HENT: Negative for trouble swallowing and voice change.    Eyes: Negative for redness and visual disturbance.   Respiratory: Negative for cough and wheezing.    Cardiovascular: Negative for chest pain and palpitations.   Gastrointestinal: Negative for abdominal pain, constipation, diarrhea, nausea and vomiting.   Genitourinary: Negative for urgency.   Musculoskeletal: Negative for joint swelling.   Neurological: Negative for syncope and headaches.   Hematological: Negative for adenopathy.   Psychiatric/Behavioral: Positive for sleep disturbance.       Objective   Physical Exam  Physical Exam  Constitutional:       Appearance: He is well-developed.   HENT:      Head: Normocephalic.   Eyes:      Pupils: Pupils are equal, round, and reactive to light.   Neck:      Musculoskeletal: Normal range of motion.      Thyroid: No thyromegaly.   Cardiovascular:      Rate and Rhythm: Normal rate and regular rhythm.      Heart sounds: Normal heart sounds. No murmur. No friction rub. No gallop.    Pulmonary:      " Breath sounds: Normal breath sounds.   Abdominal:      General: There is no distension.      Palpations: Abdomen is soft. There is no mass.      Tenderness: There is no abdominal tenderness.   Musculoskeletal: Normal range of motion.      Comments: 2+ pulses 2+ DTRs get up and go 3 to 5 seconds gait normal   Skin:     General: Skin is warm and dry.      Comments: No new lesions   Neurological:      Mental Status: He is alert and oriented to person, place, and time.      Deep Tendon Reflexes: Reflexes are normal and symmetric.   Psychiatric:         Attention and Perception: Attention normal.         Mood and Affect: Mood normal.         Speech: Speech normal.         Behavior: Behavior normal.         Thought Content: Thought content normal.         Cognition and Memory: Cognition normal.           Visit Vitals  /80   Ht 182.9 cm (72\")   Wt 92.6 kg (204 lb 3.2 oz)   BMI 27.69 kg/m²     Body mass index is 27.69 kg/m².      Assessment/Plan   Diagnoses and all orders for this visit:    1. Essential hypertension (Primary)  -     Comprehensive Metabolic Panel  -     Magnesium  -     olmesartan (BENICAR) 20 MG tablet; Take 1 tablet by mouth Daily.  Dispense: 90 tablet; Refill: 3    2. Adjustment insomnia  -     traZODone (DESYREL) 100 MG tablet; 2qhs for sleep.  New dosing  Dispense: 180 tablet; Refill: 3    3. Need for vaccination  -     Pneumococcal Polysaccharide Vaccine 23-Valent Greater Than or Equal To 1yo Subcutaneous / IM    4. Screening for malignant neoplasm of prostate  -     PSA Screen    5. Vasculogenic erectile dysfunction, unspecified vasculogenic erectile dysfunction type  -     sildenafil (VIAGRA) 100 MG tablet; Half to one qd prn  Dispense: 10 tablet; Refill: 5    Increase trazodone to 200 mg nightly.  Continue other medicines as the same orders as above.  Encouraged to stay off cigarettes.  Will sign up for Covid vaccine when available.  Recheck 6 months  "

## 2021-03-12 ENCOUNTER — IMMUNIZATION (OUTPATIENT)
Dept: VACCINE CLINIC | Facility: HOSPITAL | Age: 66
End: 2021-03-12

## 2021-03-12 PROCEDURE — 0001A: CPT | Performed by: THORACIC SURGERY (CARDIOTHORACIC VASCULAR SURGERY)

## 2021-03-12 PROCEDURE — 91300 HC SARSCOV02 VAC 30MCG/0.3ML IM: CPT | Performed by: THORACIC SURGERY (CARDIOTHORACIC VASCULAR SURGERY)

## 2021-04-02 ENCOUNTER — IMMUNIZATION (OUTPATIENT)
Dept: VACCINE CLINIC | Facility: HOSPITAL | Age: 66
End: 2021-04-02

## 2021-04-02 PROCEDURE — 91300 HC SARSCOV02 VAC 30MCG/0.3ML IM: CPT | Performed by: THORACIC SURGERY (CARDIOTHORACIC VASCULAR SURGERY)

## 2021-04-02 PROCEDURE — 0002A: CPT | Performed by: THORACIC SURGERY (CARDIOTHORACIC VASCULAR SURGERY)

## 2021-08-06 DIAGNOSIS — I10 ESSENTIAL HYPERTENSION: Chronic | ICD-10-CM

## 2021-08-06 DIAGNOSIS — N52.9 VASCULOGENIC ERECTILE DYSFUNCTION, UNSPECIFIED VASCULOGENIC ERECTILE DYSFUNCTION TYPE: Chronic | ICD-10-CM

## 2021-08-09 RX ORDER — ASPIRIN 81 MG/1
TABLET ORAL
Qty: 30 TABLET | Refills: 3 | Status: SHIPPED | OUTPATIENT
Start: 2021-08-09 | End: 2021-12-09

## 2021-08-09 RX ORDER — SILDENAFIL 100 MG/1
TABLET, FILM COATED ORAL
Qty: 10 TABLET | Refills: 5 | Status: SHIPPED | OUTPATIENT
Start: 2021-08-09 | End: 2022-02-24 | Stop reason: SDUPTHER

## 2021-08-09 RX ORDER — OLMESARTAN MEDOXOMIL 20 MG/1
TABLET ORAL
Qty: 30 TABLET | Refills: 3 | Status: SHIPPED | OUTPATIENT
Start: 2021-08-09 | End: 2021-08-24 | Stop reason: SDUPTHER

## 2021-08-24 ENCOUNTER — OFFICE VISIT (OUTPATIENT)
Dept: FAMILY MEDICINE CLINIC | Facility: CLINIC | Age: 66
End: 2021-08-24

## 2021-08-24 VITALS
SYSTOLIC BLOOD PRESSURE: 128 MMHG | BODY MASS INDEX: 26.64 KG/M2 | HEIGHT: 72 IN | DIASTOLIC BLOOD PRESSURE: 74 MMHG | WEIGHT: 196.7 LBS

## 2021-08-24 DIAGNOSIS — Z12.5 SCREENING FOR MALIGNANT NEOPLASM OF PROSTATE: ICD-10-CM

## 2021-08-24 DIAGNOSIS — F51.02 ADJUSTMENT INSOMNIA: Chronic | ICD-10-CM

## 2021-08-24 DIAGNOSIS — I10 ESSENTIAL HYPERTENSION: Primary | Chronic | ICD-10-CM

## 2021-08-24 DIAGNOSIS — M54.50 CHRONIC BILATERAL LOW BACK PAIN WITHOUT SCIATICA: ICD-10-CM

## 2021-08-24 DIAGNOSIS — G89.29 CHRONIC BILATERAL LOW BACK PAIN WITHOUT SCIATICA: ICD-10-CM

## 2021-08-24 DIAGNOSIS — Z13.220 SCREENING CHOLESTEROL LEVEL: ICD-10-CM

## 2021-08-24 PROCEDURE — 99214 OFFICE O/P EST MOD 30 MIN: CPT | Performed by: FAMILY MEDICINE

## 2021-08-24 RX ORDER — CYCLOBENZAPRINE HCL 5 MG
TABLET ORAL
Qty: 60 TABLET | Refills: 5 | Status: SHIPPED | OUTPATIENT
Start: 2021-08-24 | End: 2022-01-10

## 2021-08-24 RX ORDER — OLMESARTAN MEDOXOMIL 20 MG/1
20 TABLET ORAL DAILY
Qty: 90 TABLET | Refills: 3 | Status: SHIPPED | OUTPATIENT
Start: 2021-08-24 | End: 2021-12-09

## 2021-08-24 NOTE — PROGRESS NOTES
Subjective   Brandin Jackson is a 65 y.o. male.  Reevaluation hypertension adjustment insomnia.  In interim is developed a chronic back pain mechanical is treating with working out at the gym a weight loss diet and exercise requesting mild muscle relaxant especially at night.  Medicines have remained the same.  Feels well try to remain active and healthy.  Is up-to-date on all.    History of Present Illness   HPI    The following portions of the patient's history were reviewed and updated as appropriate: allergies, current medications, past family history, past medical history, past social history, past surgical history and problem list.    Review of Systems  Review of Systems   Constitutional: Negative for activity change, appetite change, fatigue and unexpected weight change.   HENT: Negative for trouble swallowing and voice change.    Eyes: Negative for redness and visual disturbance.   Respiratory: Negative for cough and wheezing.    Cardiovascular: Negative for chest pain and palpitations.   Gastrointestinal: Negative for abdominal pain, constipation, diarrhea, nausea and vomiting.   Genitourinary: Negative for urgency.   Musculoskeletal: Positive for back pain. Negative for joint swelling.   Neurological: Negative for syncope and headaches.   Hematological: Negative for adenopathy.   Psychiatric/Behavioral: Positive for sleep disturbance (Better on medication).       Objective   Physical Exam  Physical Exam  Constitutional:       Appearance: He is well-developed.   HENT:      Head: Normocephalic.   Eyes:      Pupils: Pupils are equal, round, and reactive to light.   Neck:      Thyroid: No thyromegaly.   Cardiovascular:      Rate and Rhythm: Normal rate and regular rhythm.      Heart sounds: Normal heart sounds. No murmur heard.   No friction rub. No gallop.    Pulmonary:      Breath sounds: Normal breath sounds.   Abdominal:      General: There is no distension.      Palpations: Abdomen is soft. There is no  "mass.      Tenderness: There is no abdominal tenderness.   Musculoskeletal:         General: Normal range of motion.      Cervical back: Normal range of motion.      Comments: No peripheral edema get up and go 3 to 5 seconds gait normal   Skin:     General: Skin is warm and dry.      Comments: [place] tanned counseled on same   Neurological:      Mental Status: He is alert and oriented to person, place, and time.      Deep Tendon Reflexes: Reflexes are normal and symmetric.   Psychiatric:         Attention and Perception: Attention normal.         Mood and Affect: Mood normal.         Speech: Speech normal.         Behavior: Behavior normal.         Thought Content: Thought content normal.         Cognition and Memory: Cognition normal.           Visit Vitals  /82   Ht 182.9 cm (72\")   Wt 89.2 kg (196 lb 11.2 oz)   BMI 26.68 kg/m²     Body mass index is 26.68 kg/m².    /74   Ht 182.9 cm (72\")   Wt 89.2 kg (196 lb 11.2 oz)   BMI 26.68 kg/m²     Assessment/Plan   Diagnoses and all orders for this visit:    1. Essential hypertension (Primary)  -     olmesartan (BENICAR) 20 MG tablet; Take 1 tablet by mouth Daily.  Dispense: 90 tablet; Refill: 3  -     Magnesium; Future  -     Comprehensive Metabolic Panel; Future    2. Adjustment insomnia    3. Chronic bilateral low back pain without sciatica  -     cyclobenzaprine (FLEXERIL) 5 MG tablet; 1 bid to tid prn back  Dispense: 60 tablet; Refill: 5    4. Screening for malignant neoplasm of prostate  -     PSA Screen; Future    5. Screening cholesterol level  -     Lipid Panel With LDL / HDL Ratio; Future    Back heat rest massage stretching short-term medication counseled on disease process counseled on lifestyle measures flu vaccine in the fall Covid booster when available recheck 6 months lab prior  "

## 2021-12-09 DIAGNOSIS — I10 ESSENTIAL HYPERTENSION: Chronic | ICD-10-CM

## 2021-12-09 RX ORDER — ASPIRIN 81 MG/1
TABLET ORAL
Qty: 30 TABLET | Refills: 3 | Status: SHIPPED | OUTPATIENT
Start: 2021-12-09 | End: 2022-10-27

## 2021-12-09 RX ORDER — OLMESARTAN MEDOXOMIL 20 MG/1
TABLET ORAL
Qty: 30 TABLET | Refills: 3 | Status: SHIPPED | OUTPATIENT
Start: 2021-12-09 | End: 2022-10-27

## 2022-01-03 ENCOUNTER — TELEPHONE (OUTPATIENT)
Dept: FAMILY MEDICINE CLINIC | Facility: CLINIC | Age: 67
End: 2022-01-03

## 2022-01-10 ENCOUNTER — OFFICE VISIT (OUTPATIENT)
Dept: FAMILY MEDICINE CLINIC | Facility: CLINIC | Age: 67
End: 2022-01-10

## 2022-01-10 VITALS
BODY MASS INDEX: 27.77 KG/M2 | WEIGHT: 205 LBS | SYSTOLIC BLOOD PRESSURE: 118 MMHG | DIASTOLIC BLOOD PRESSURE: 72 MMHG | HEIGHT: 72 IN

## 2022-01-10 DIAGNOSIS — M77.42 METATARSALGIA, LEFT FOOT: ICD-10-CM

## 2022-01-10 DIAGNOSIS — M79.672 CHRONIC FOOT PAIN, LEFT: Primary | ICD-10-CM

## 2022-01-10 DIAGNOSIS — G89.29 CHRONIC FOOT PAIN, LEFT: Primary | ICD-10-CM

## 2022-01-10 PROBLEM — M54.50 CHRONIC BILATERAL LOW BACK PAIN WITHOUT SCIATICA: Chronic | Status: ACTIVE | Noted: 2021-08-24

## 2022-01-10 PROCEDURE — 99213 OFFICE O/P EST LOW 20 MIN: CPT | Performed by: FAMILY MEDICINE

## 2022-01-10 NOTE — PROGRESS NOTES
Subjective   Brandin Jackson is a 66 y.o. male.  Requesting evaluation chronic foot pain left-sided for many years.  Had injections to 3 years ago but no result.  Made slightly worse by bilateral knee replacement.  Pain is in the metatarsal pad and heads anteriorly.  Worse at the end of the day sometimes reactive spasm up into the arch.  No history of trauma.    History of Present Illness   HPI    The following portions of the patient's history were reviewed and updated as appropriate: allergies, current medications, past family history, past medical history, past social history, past surgical history and problem list.    Review of Systems  Review of Systems   Constitutional: Negative for activity change, appetite change, fatigue and unexpected weight change.   HENT: Negative for trouble swallowing and voice change.    Eyes: Negative for redness and visual disturbance.   Respiratory: Negative for cough and wheezing.    Cardiovascular: Negative for chest pain and palpitations.   Gastrointestinal: Negative for abdominal pain, constipation, diarrhea, nausea and vomiting.   Genitourinary: Negative for urgency.   Musculoskeletal: Positive for arthralgias. Negative for joint swelling.   Neurological: Negative for syncope and headaches.   Hematological: Negative for adenopathy.   Psychiatric/Behavioral: Negative for sleep disturbance.       Objective   Physical Exam  Physical Exam  Constitutional:       Appearance: Normal appearance. He is normal weight.   Cardiovascular:      Pulses:           Dorsalis pedis pulses are 1+ on the left side.   Pulmonary:      Effort: Pulmonary effort is normal.   Musculoskeletal:      Left foot: Prominent metatarsal heads present.   Feet:      Comments: Left foot shows normal arch no skin breakdown 2+ pulses 2+ DTRs the metatarsal pad is slightly hypertrophied especially laterally.  Metatarsal head slightly tender no Hood's neuroma sign.  Get up and go 3 to 5 seconds  Neurological:       "Mental Status: He is alert.   Psychiatric:      Comments: No distress           Visit Vitals  /72   Ht 182.9 cm (72\")   Wt 93 kg (205 lb)   BMI 27.80 kg/m²     Body mass index is 27.8 kg/m².  /72   Ht 182.9 cm (72\")   Wt 93 kg (205 lb)   BMI 27.80 kg/m²       Assessment/Plan   Diagnoses and all orders for this visit:    1. Chronic foot pain, left (Primary)  -     Ambulatory Referral to Podiatry    2. Metatarsalgia, left foot  -     Ambulatory Referral to Podiatry    Suspect a weightbearing issue chronic counseled on need for probable foot and ankle evaluation for weight distribution etc.  Counseled on keeping shoes the same for now referral made.  Continue current medicines keep regular follow-up  "

## 2022-01-12 ENCOUNTER — OFFICE VISIT (OUTPATIENT)
Dept: PODIATRY | Facility: CLINIC | Age: 67
End: 2022-01-12

## 2022-01-12 VITALS
DIASTOLIC BLOOD PRESSURE: 78 MMHG | WEIGHT: 205 LBS | BODY MASS INDEX: 27.77 KG/M2 | SYSTOLIC BLOOD PRESSURE: 122 MMHG | OXYGEN SATURATION: 99 % | HEIGHT: 72 IN | HEART RATE: 68 BPM

## 2022-01-12 DIAGNOSIS — M77.42 METATARSALGIA, LEFT FOOT: ICD-10-CM

## 2022-01-12 DIAGNOSIS — M72.2 PLANTAR FASCIITIS: ICD-10-CM

## 2022-01-12 DIAGNOSIS — M79.672 LEFT FOOT PAIN: Primary | ICD-10-CM

## 2022-01-12 PROCEDURE — 99203 OFFICE O/P NEW LOW 30 MIN: CPT | Performed by: PODIATRIST

## 2022-01-12 RX ORDER — MELOXICAM 15 MG/1
15 TABLET ORAL DAILY
Qty: 30 TABLET | Refills: 2 | Status: SHIPPED | OUTPATIENT
Start: 2022-01-12 | End: 2023-02-27

## 2022-01-12 NOTE — PROGRESS NOTES
"Brandin Jackson  1955  66 y.o. male    Left foot pain.    2022    Chief Complaint   Patient presents with   • Left Foot - Pain       History of Present Illness    Brandin Jackson is a 66 y.o.male who presents to clinic today with longstanding left foot pain.  Pain is primarily localized to the ball of the left foot.  There are no associated injuries.  He has no other complaints.      Past Medical History:   Diagnosis Date   • Hypertension          Past Surgical History:   Procedure Laterality Date   • KIDNEY STONE SURGERY     • TOTAL KNEE ARTHROPLASTY Left 2017         Family History   Problem Relation Age of Onset   • Stroke Mother        Allergies   Allergen Reactions   • Penicillins Swelling     Itching       Social History     Socioeconomic History   • Marital status:    Tobacco Use   • Smoking status: Former Smoker     Quit date: 2020     Years since quittin.1   • Smokeless tobacco: Never Used   Vaping Use   • Vaping Use: Never used   Substance and Sexual Activity   • Alcohol use: Yes   • Drug use: No   • Sexual activity: Yes         Current Outpatient Medications   Medication Sig Dispense Refill   • Aspirin Adult Low Strength 81 MG EC tablet TAKE 1 TABLET BY MOUTH DAILY. 30 tablet 3   • olmesartan (BENICAR) 20 MG tablet TAKE 1 TABLET BY MOUTH DAILY FOR FOR BLOOD PRESSURE TILL SEEN AGAIN 30 tablet 3   • sildenafil (VIAGRA) 100 MG tablet TAKE 1/2 TO 1 TABLET BY MOUTH DAILY AS NEEDED 10 tablet 5   • traZODone (DESYREL) 100 MG tablet 2qhs for sleep.  New dosing 180 tablet 3   • meloxicam (MOBIC) 15 MG tablet Take 1 tablet by mouth Daily. 30 tablet 2     No current facility-administered medications for this visit.       Review of Systems   Musculoskeletal: Positive for back pain.        Foot pain   All other systems reviewed and are negative.        OBJECTIVE    /78   Pulse 68   Ht 182.9 cm (72\")   Wt 93 kg (205 lb)   SpO2 99%   BMI 27.80 kg/m²     Physical Exam  Vitals " reviewed.   Constitutional:       General: He is not in acute distress.     Appearance: He is well-developed.   HENT:      Head: Normocephalic and atraumatic.      Nose: Nose normal.   Eyes:      Conjunctiva/sclera: Conjunctivae normal.      Pupils: Pupils are equal, round, and reactive to light.   Pulmonary:      Effort: Pulmonary effort is normal. No respiratory distress.      Breath sounds: No wheezing.   Musculoskeletal:         General: Tenderness present. No deformity. Normal range of motion.   Skin:     General: Skin is warm and dry.      Capillary Refill: Capillary refill takes less than 2 seconds.   Neurological:      Mental Status: He is alert and oriented to person, place, and time.   Psychiatric:         Behavior: Behavior normal.         Thought Content: Thought content normal.          Left Lower Extremity Exam:     Cardiovascular:    Palpable pedal pulses  CFT immediate to distal digits  Pedal hair growth present   Musculoskeletal:  Muscle strength is WNL  Pain on palpation to the plantar second, third and fourth metatarsal heads.  Slight pain on palpation to the medial tubercle left calcaneus.  Negative lateral squeeze test.  Dermatological:   Skin warm, dry and intact   Neurological:   Sensation intact light touch      Foot/Ankle Exam        Procedures        ASSESSMENT AND PLAN    Diagnoses and all orders for this visit:    1. Left foot pain (Primary)  -     XR Foot 3+ View Left    2. Plantar fasciitis    3. Metatarsalgia, left foot    Other orders  -     meloxicam (MOBIC) 15 MG tablet; Take 1 tablet by mouth Daily.  Dispense: 30 tablet; Refill: 2        - Comprehensive foot and ankle exam performed  - X-rays taken and reviewed.  No acute osseous or articular abnormalities.  - Rx for mobic  - Patient advised to stretch, ice and to make appropriate shoe gear changes to include wearing athletic type shoes with supportive insoles. Patient was given written instructions on how to correctly perform the  stretching of the Achilles tendon/calf stretches, and the heel spur/plantar fasciitis regimen. Limit bare foot walking.    - Recommended over-the-counter insole such as power steps (pinnicle plus), spenco or walk fit  to properly support the arch in order to alleviate the tension and stress on the plantar fascia associated with normal daily walking. Patient was educated on the break-in period for new arch supports.  - All questions were answered to the patient's satisfaction.  - RTC in 6-8 weeks as needed           This document has been electronically signed by Ben Judge DPM on January 12, 2022 16:31 CST     1/12/2022  16:31 CST

## 2022-01-27 ENCOUNTER — TELEPHONE (OUTPATIENT)
Dept: FAMILY MEDICINE CLINIC | Facility: CLINIC | Age: 67
End: 2022-01-27

## 2022-01-27 NOTE — TELEPHONE ENCOUNTER
Patient called wanting to know if he can get something called in for a sinus infection, he is also experiencing pain in the right ear, no fever & head congestion. He would like a return call please.       Pharmacy   [facility]    Thanks

## 2022-02-21 ENCOUNTER — LAB (OUTPATIENT)
Dept: LAB | Facility: HOSPITAL | Age: 67
End: 2022-02-21

## 2022-02-21 DIAGNOSIS — I10 ESSENTIAL HYPERTENSION: Chronic | ICD-10-CM

## 2022-02-21 DIAGNOSIS — Z12.5 SCREENING FOR MALIGNANT NEOPLASM OF PROSTATE: ICD-10-CM

## 2022-02-21 DIAGNOSIS — Z13.220 SCREENING CHOLESTEROL LEVEL: ICD-10-CM

## 2022-02-21 LAB
CHOLEST SERPL-MCNC: 136 MG/DL (ref 0–200)
HDLC SERPL-MCNC: 69 MG/DL (ref 40–60)
LDLC SERPL CALC-MCNC: 56 MG/DL (ref 0–100)
LDLC/HDLC SERPL: 0.83 {RATIO}
TRIGL SERPL-MCNC: 49 MG/DL (ref 0–150)
VLDLC SERPL-MCNC: 11 MG/DL (ref 5–40)

## 2022-02-21 PROCEDURE — G0103 PSA SCREENING: HCPCS

## 2022-02-21 PROCEDURE — 80061 LIPID PANEL: CPT

## 2022-02-21 PROCEDURE — 36415 COLL VENOUS BLD VENIPUNCTURE: CPT

## 2022-02-21 PROCEDURE — 83735 ASSAY OF MAGNESIUM: CPT

## 2022-02-21 PROCEDURE — 80053 COMPREHEN METABOLIC PANEL: CPT

## 2022-02-22 LAB
ALBUMIN SERPL-MCNC: 4.6 G/DL (ref 3.5–5.2)
ALBUMIN/GLOB SERPL: 2.2 G/DL
ALP SERPL-CCNC: 85 U/L (ref 39–117)
ALT SERPL W P-5'-P-CCNC: 18 U/L (ref 1–41)
ANION GAP SERPL CALCULATED.3IONS-SCNC: 14 MMOL/L (ref 5–15)
AST SERPL-CCNC: 17 U/L (ref 1–40)
BILIRUB SERPL-MCNC: 0.4 MG/DL (ref 0–1.2)
BUN SERPL-MCNC: 16 MG/DL (ref 8–23)
BUN/CREAT SERPL: 13.8 (ref 7–25)
CALCIUM SPEC-SCNC: 9.5 MG/DL (ref 8.6–10.5)
CHLORIDE SERPL-SCNC: 106 MMOL/L (ref 98–107)
CO2 SERPL-SCNC: 21 MMOL/L (ref 22–29)
CREAT SERPL-MCNC: 1.16 MG/DL (ref 0.76–1.27)
GFR SERPL CREATININE-BSD FRML MDRD: 63 ML/MIN/1.73
GLOBULIN UR ELPH-MCNC: 2.1 GM/DL
GLUCOSE SERPL-MCNC: 108 MG/DL (ref 65–99)
MAGNESIUM SERPL-MCNC: 2.2 MG/DL (ref 1.6–2.4)
POTASSIUM SERPL-SCNC: 4.2 MMOL/L (ref 3.5–5.2)
PROT SERPL-MCNC: 6.7 G/DL (ref 6–8.5)
PSA SERPL-MCNC: 5.3 NG/ML (ref 0–4)
SODIUM SERPL-SCNC: 141 MMOL/L (ref 136–145)

## 2022-02-24 ENCOUNTER — OFFICE VISIT (OUTPATIENT)
Dept: FAMILY MEDICINE CLINIC | Facility: CLINIC | Age: 67
End: 2022-02-24

## 2022-02-24 VITALS
HEIGHT: 72 IN | DIASTOLIC BLOOD PRESSURE: 78 MMHG | BODY MASS INDEX: 28.17 KG/M2 | WEIGHT: 208 LBS | SYSTOLIC BLOOD PRESSURE: 112 MMHG

## 2022-02-24 DIAGNOSIS — R97.20 ELEVATED PSA: ICD-10-CM

## 2022-02-24 DIAGNOSIS — N52.9 VASCULOGENIC ERECTILE DYSFUNCTION, UNSPECIFIED VASCULOGENIC ERECTILE DYSFUNCTION TYPE: Chronic | ICD-10-CM

## 2022-02-24 DIAGNOSIS — Z00.00 MEDICARE ANNUAL WELLNESS VISIT, INITIAL: ICD-10-CM

## 2022-02-24 DIAGNOSIS — I10 ESSENTIAL HYPERTENSION: Primary | Chronic | ICD-10-CM

## 2022-02-24 DIAGNOSIS — F51.02 ADJUSTMENT INSOMNIA: Chronic | ICD-10-CM

## 2022-02-24 PROCEDURE — 99214 OFFICE O/P EST MOD 30 MIN: CPT | Performed by: FAMILY MEDICINE

## 2022-02-24 PROCEDURE — 1159F MED LIST DOCD IN RCRD: CPT | Performed by: FAMILY MEDICINE

## 2022-02-24 PROCEDURE — G0402 INITIAL PREVENTIVE EXAM: HCPCS | Performed by: FAMILY MEDICINE

## 2022-02-24 PROCEDURE — 1126F AMNT PAIN NOTED NONE PRSNT: CPT | Performed by: FAMILY MEDICINE

## 2022-02-24 RX ORDER — SILDENAFIL 100 MG/1
TABLET, FILM COATED ORAL
Qty: 10 TABLET | Refills: 5 | Status: SHIPPED | OUTPATIENT
Start: 2022-02-24 | End: 2023-03-13

## 2022-02-24 RX ORDER — TRAZODONE HYDROCHLORIDE 100 MG/1
TABLET ORAL
Qty: 180 TABLET | Refills: 3 | Status: SHIPPED | OUTPATIENT
Start: 2022-02-24 | End: 2022-12-13

## 2022-02-24 NOTE — PROGRESS NOTES
The ABCs of the Annual Wellness Visit  Initial Medicare Wellness Visit    Chief Complaint   Patient presents with   • Hypertension     6 month reval     Subjective   History of Present Illness:  Brandin Jackson is a 66 y.o. male who presents for an Initial Medicare Wellness Visit.    The following portions of the patient's history were reviewed and   updated as appropriate: allergies, current medications, past family history, past medical history, past social history, past surgical history and problem list.     Compared to one year ago, the patient feels his physical   health is better.    Compared to one year ago, the patient feels his mental   health is the same.    Recent Hospitalizations:  He was not admitted to the hospital during the last year.       Current Medical Providers:  Patient Care Team:  Jasson Alvares MD as PCP - General (Family Medicine)    Outpatient Medications Prior to Visit   Medication Sig Dispense Refill   • Aspirin Adult Low Strength 81 MG EC tablet TAKE 1 TABLET BY MOUTH DAILY. 30 tablet 3   • meloxicam (MOBIC) 15 MG tablet Take 1 tablet by mouth Daily. 30 tablet 2   • olmesartan (BENICAR) 20 MG tablet TAKE 1 TABLET BY MOUTH DAILY FOR FOR BLOOD PRESSURE TILL SEEN AGAIN 30 tablet 3   • sildenafil (VIAGRA) 100 MG tablet TAKE 1/2 TO 1 TABLET BY MOUTH DAILY AS NEEDED 10 tablet 5   • traZODone (DESYREL) 100 MG tablet 2qhs for sleep.  New dosing 180 tablet 3     No facility-administered medications prior to visit.       No opioid medication identified on active medication list. I have reviewed chart for other potential  high risk medication/s and harmful drug interactions in the elderly.          Aspirin is on active medication list. Aspirin use is indicated based on review of current medical condition/s. Pros and cons of this therapy have been discussed today. Benefits of this medication outweigh potential harm.  Patient has been encouraged to continue taking this medication.  .      Patient  "Active Problem List   Diagnosis   • Adjustment insomnia   • Sun-damaged skin   • Essential hypertension   • Vasculogenic erectile dysfunction   • Chronic bilateral low back pain without sciatica     Advance Care Planning  Advance Directive is not on file.  ACP discussion was held with the patient during this visit. Patient does not have an advance directive, information provided.          Objective       Vitals:    22 0949   BP: 112/78   Weight: 94.3 kg (208 lb)   Height: 182.9 cm (72\")   PainSc: 0-No pain     BMI Readings from Last 1 Encounters:   22 28.21 kg/m²   BMI is above normal parameters. Recommendations include: none (medical contraindication)    Does the patient have evidence of cognitive impairment? No    Physical Exam  Lab Results   Component Value Date    TRIG 49 2022    HDL 69 (H) 2022    LDL 56 2022    VLDL 11 2022          HEALTH RISK ASSESSMENT    Smoking Status:  Social History     Tobacco Use   Smoking Status Former Smoker   • Quit date: 2020   • Years since quittin.2   Smokeless Tobacco Never Used     Alcohol Consumption:  Social History     Substance and Sexual Activity   Alcohol Use Yes     Fall Risk Screen:    STEADI Fall Risk Assessment was completed, and patient is at LOW risk for falls.Assessment completed on:1/10/2022    Depression Screen:   PHQ-2/PHQ-9 Depression Screening 2022   Little interest or pleasure in doing things 0   Feeling down, depressed, or hopeless 0   Total Score 0       Health Habits and Functional and Cognitive Screening:  No flowsheet data found.    Age-appropriate Screening Schedule:  Refer to the list below for future screening recommendations based on patient's age, sex and/or medical conditions. Orders for these recommended tests are listed in the plan section. The patient has been provided with a written plan.    Health Maintenance   Topic Date Due   • TDAP/TD VACCINES (1 - Tdap) 2024 (Originally 1974) "   • ZOSTER VACCINE (1 of 2) 03/20/2026 (Originally 11/4/2006)   • INFLUENZA VACCINE  Completed            Assessment/Plan   CMS Preventative Services Quick Reference  Risk Factors Identified During Encounter  Immunizations Discussed/Encouraged (specific Immunizations; Influenza  The above risks/problems have been discussed with the patient.  Follow up actions/plans if indicated are seen below in the Assessment/Plan Section.  Pertinent information has been shared with the patient in the After Visit Summary.    Diagnoses and all orders for this visit:    1. Essential hypertension (Primary)    2. Adjustment insomnia  -     traZODone (DESYREL) 100 MG tablet; 2qhs for sleep.  New dosing  Dispense: 180 tablet; Refill: 3    3. Elevated PSA  -     PSA DIAGNOSTIC; Future    4. Medicare annual wellness visit, initial    5. Vasculogenic erectile dysfunction, unspecified vasculogenic erectile dysfunction type  -     sildenafil (VIAGRA) 100 MG tablet; TAKE 1/2 TO 1 TABLET BY MOUTH DAILY AS NEEDED  Dispense: 10 tablet; Refill: 5        Follow Up:  No follow-ups on file.     An After Visit Summary and PPPS were made available to the patient.                 The patient does not have a history of falls. I did complete a risk assessment for falls. A plan of care for falls was documented.    Hydration is mention for fall prevention

## 2022-02-24 NOTE — PROGRESS NOTES
Subjective   Brandin Jackson is a 66 y.o. male.  Reevaluation mild hypertension erectile dysfunction diagnoses below in interim lab work revealed an LDL of 56 however PSA has jumped up from 4.9-5.3.  Given age we have agreed to just repeat in 3 months.  Feels well works out 5 days a week exercising watching diet trying to remain active.  Is up-to-date on all immunizations and screenings.  History noted.    History of Present Illness   HPI    The following portions of the patient's history were reviewed and updated as appropriate: allergies, current medications, past family history, past medical history, past social history, past surgical history and problem list.    Review of Systems  Review of Systems   Constitutional: Negative for activity change, appetite change, fatigue and unexpected weight change.   HENT: Negative for trouble swallowing and voice change.    Eyes: Negative for redness and visual disturbance.   Respiratory: Negative for cough and wheezing.    Cardiovascular: Negative for chest pain and palpitations.   Gastrointestinal: Negative for abdominal pain, constipation, diarrhea, nausea and vomiting.   Genitourinary: Negative for urgency.   Musculoskeletal: Negative for joint swelling.   Neurological: Negative for syncope and headaches.   Hematological: Negative for adenopathy.   Psychiatric/Behavioral: Negative for sleep disturbance.       Objective   Physical Exam  Physical Exam  Constitutional:       Appearance: He is well-developed.   HENT:      Head: Normocephalic.   Eyes:      Pupils: Pupils are equal, round, and reactive to light.   Neck:      Thyroid: No thyromegaly.   Cardiovascular:      Rate and Rhythm: Normal rate and regular rhythm.      Heart sounds: Normal heart sounds. No murmur heard.  No friction rub. No gallop.    Pulmonary:      Breath sounds: Normal breath sounds.   Abdominal:      General: There is no distension.      Palpations: Abdomen is soft. There is no mass.      Tenderness:  There is no abdominal tenderness.   Musculoskeletal:         General: Normal range of motion.      Cervical back: Normal range of motion.      Comments: No peripheral edema 2+ pulses get up and go 3 to 5 seconds gait normal   Skin:     General: Skin is warm and dry.   Neurological:      Mental Status: He is alert and oriented to person, place, and time.      Deep Tendon Reflexes: Reflexes are normal and symmetric.   Psychiatric:         Attention and Perception: Attention normal.         Mood and Affect: Mood normal.         Speech: Speech normal.         Behavior: Behavior normal.         Thought Content: Thought content normal.         Cognition and Memory: Cognition normal.       Results for orders placed or performed in visit on 02/21/22   Lipid Panel With LDL / HDL Ratio    Specimen: Blood   Result Value Ref Range    Total Cholesterol 136 0 - 200 mg/dL    Triglycerides 49 0 - 150 mg/dL    HDL Cholesterol 69 (H) 40 - 60 mg/dL    LDL Cholesterol  56 0 - 100 mg/dL    VLDL Cholesterol 11 5 - 40 mg/dL    LDL/HDL Ratio 0.83    Magnesium    Specimen: Blood   Result Value Ref Range    Magnesium 2.2 1.6 - 2.4 mg/dL   PSA Screen    Specimen: Blood   Result Value Ref Range    PSA 5.300 (H) 0.000 - 4.000 ng/mL   Comprehensive Metabolic Panel    Specimen: Blood   Result Value Ref Range    Glucose 108 (H) 65 - 99 mg/dL    BUN 16 8 - 23 mg/dL    Creatinine 1.16 0.76 - 1.27 mg/dL    Sodium 141 136 - 145 mmol/L    Potassium 4.2 3.5 - 5.2 mmol/L    Chloride 106 98 - 107 mmol/L    CO2 21.0 (L) 22.0 - 29.0 mmol/L    Calcium 9.5 8.6 - 10.5 mg/dL    Total Protein 6.7 6.0 - 8.5 g/dL    Albumin 4.60 3.50 - 5.20 g/dL    ALT (SGPT) 18 1 - 41 U/L    AST (SGOT) 17 1 - 40 U/L    Alkaline Phosphatase 85 39 - 117 U/L    Total Bilirubin 0.4 0.0 - 1.2 mg/dL    eGFR Non African Amer 63 >60 mL/min/1.73    Globulin 2.1 gm/dL    A/G Ratio 2.2 g/dL    BUN/Creatinine Ratio 13.8 7.0 - 25.0    Anion Gap 14.0 5.0 - 15.0 mmol/L           Visit  "Vitals  /78   Ht 182.9 cm (72\")   Wt 94.3 kg (208 lb)   BMI 28.21 kg/m²     Body mass index is 28.21 kg/m².  /78   Ht 182.9 cm (72\")   Wt 94.3 kg (208 lb)   BMI 28.21 kg/m²       Assessment/Plan   Diagnoses and all orders for this visit:    1. Essential hypertension (Primary)    2. Adjustment insomnia  -     traZODone (DESYREL) 100 MG tablet; 2qhs for sleep.  New dosing  Dispense: 180 tablet; Refill: 3    3. Elevated PSA  -     PSA DIAGNOSTIC; Future    4. Medicare annual wellness visit, initial    5. Vasculogenic erectile dysfunction, unspecified vasculogenic erectile dysfunction type  -     sildenafil (VIAGRA) 100 MG tablet; TAKE 1/2 TO 1 TABLET BY MOUTH DAILY AS NEEDED  Dispense: 10 tablet; Refill: 5    Continue medication as outlined counseled on hydration fall prevention etc.  Repeat PSA in 3 months.  If not down then will need urology consultation.  Is asymptomatic.  Recheck blood pressure 6 months  "

## 2022-08-22 ENCOUNTER — LAB (OUTPATIENT)
Dept: LAB | Facility: HOSPITAL | Age: 67
End: 2022-08-22

## 2022-08-22 DIAGNOSIS — R97.20 ELEVATED PSA: ICD-10-CM

## 2022-08-22 LAB — PSA SERPL-MCNC: 6.12 NG/ML (ref 0–4)

## 2022-08-22 PROCEDURE — 84153 ASSAY OF PSA TOTAL: CPT

## 2022-08-22 PROCEDURE — 36415 COLL VENOUS BLD VENIPUNCTURE: CPT

## 2022-08-24 ENCOUNTER — OFFICE VISIT (OUTPATIENT)
Dept: FAMILY MEDICINE CLINIC | Facility: CLINIC | Age: 67
End: 2022-08-24

## 2022-08-24 VITALS
HEIGHT: 72 IN | BODY MASS INDEX: 26.61 KG/M2 | SYSTOLIC BLOOD PRESSURE: 118 MMHG | WEIGHT: 196.5 LBS | DIASTOLIC BLOOD PRESSURE: 78 MMHG

## 2022-08-24 DIAGNOSIS — L57.8 SUN-DAMAGED SKIN: Chronic | ICD-10-CM

## 2022-08-24 DIAGNOSIS — R97.20 ELEVATED PSA: ICD-10-CM

## 2022-08-24 DIAGNOSIS — I10 ESSENTIAL HYPERTENSION: Primary | Chronic | ICD-10-CM

## 2022-08-24 DIAGNOSIS — Z87.898 HISTORY OF ELEVATED PROSTATE SPECIFIC ANTIGEN (PSA): ICD-10-CM

## 2022-08-24 DIAGNOSIS — F51.02 ADJUSTMENT INSOMNIA: Chronic | ICD-10-CM

## 2022-08-24 PROCEDURE — 99214 OFFICE O/P EST MOD 30 MIN: CPT | Performed by: FAMILY MEDICINE

## 2022-08-24 NOTE — PROGRESS NOTES
Subjective   Brandin Jackson is a 66 y.o. male.  Evaluation hypertension-sun damaged skin chronic back pain adjustment insomnia.  In the interim last lab work revealed PSA of 6 5.  Repeated at this time unfortuneately is up to 6.  Is asymptomatic.  Given age and gradual increase over time we have asked urology for evaluation.  In the interim continues to be active working out 23-5 times a week staying outside a great deal.  Unfortuneately sun damage skin is even more tanned.  Had cologuard in 2020.  Feels well on medicine.    History of Present Illness   HPI    The following portions of the patient's history were reviewed and updated as appropriate: allergies, current medications, past family history, past medical history, past social history, past surgical history and problem list.    Review of Systems  Review of Systems   Constitutional: Negative for activity change, appetite change, fatigue and unexpected weight change.   HENT: Negative for trouble swallowing and voice change.    Eyes: Negative for redness and visual disturbance.   Respiratory: Negative for cough and wheezing.    Cardiovascular: Negative for chest pain and palpitations.   Gastrointestinal: Negative for abdominal pain, constipation, diarrhea, nausea and vomiting.   Genitourinary: Negative for urgency.   Musculoskeletal: Negative for joint swelling.   Skin: Positive for color change.   Neurological: Negative for syncope and headaches.   Hematological: Negative for adenopathy.   Psychiatric/Behavioral: Negative for sleep disturbance.       Objective   Physical Exam  Physical Exam  Constitutional:       Appearance: He is well-developed.   HENT:      Head: Normocephalic.   Eyes:      Pupils: Pupils are equal, round, and reactive to light.   Neck:      Thyroid: No thyromegaly.   Cardiovascular:      Rate and Rhythm: Normal rate and regular rhythm.      Heart sounds: Normal heart sounds. No murmur heard.    No friction rub. No gallop.   Pulmonary:      " Breath sounds: Normal breath sounds.   Abdominal:      General: There is no distension.      Palpations: Abdomen is soft. There is no mass.      Tenderness: There is no abdominal tenderness.   Musculoskeletal:         General: Normal range of motion.      Cervical back: Normal range of motion.   Skin:     General: Skin is warm and dry.      Comments: [place] tanned early then to go on the hands   Neurological:      Mental Status: He is alert and oriented to person, place, and time.      Deep Tendon Reflexes: Reflexes are normal and symmetric.   Psychiatric:         Attention and Perception: Attention normal.         Mood and Affect: Mood normal.         Speech: Speech normal.         Behavior: Behavior normal.         Thought Content: Thought content normal.         Cognition and Memory: Cognition normal.       Results for orders placed or performed in visit on 08/22/22   PSA DIAGNOSTIC    Specimen: Blood   Result Value Ref Range    PSA 6.120 (H) 0.000 - 4.000 ng/mL           Visit Vitals  /78   Ht 182.9 cm (72\")   Wt 89.1 kg (196 lb 8 oz)   BMI 26.65 kg/m²     Body mass index is 26.65 kg/m².    /78   Ht 182.9 cm (72\")   Wt 89.1 kg (196 lb 8 oz)   BMI 26.65 kg/m²     Assessment/Plan   Diagnoses and all orders for this visit:    1. Essential hypertension (Primary)  -     Magnesium; Future  -     Comprehensive Metabolic Panel; Future    2. Sun-damaged skin    3. Adjustment insomnia    4. History of elevated prostate specific antigen (PSA)    5. Elevated PSA  -     Ambulatory Referral to Urology  -     PSA DIAGNOSTIC; Future    Counseled increasing hydration.  His not drinking enough in the summer.  Counseled watching for blood pressure bottoming out.  Counseled getting fourth COVID booster flu vaccine in the fall.  Referral to urology.  Continue medicines as is for now.  Recheck 6 months lab prior  "

## 2022-09-26 ENCOUNTER — TRANSCRIBE ORDERS (OUTPATIENT)
Dept: LAB | Facility: HOSPITAL | Age: 67
End: 2022-09-26

## 2022-09-26 DIAGNOSIS — R97.20 ELEVATED PROSTATE SPECIFIC ANTIGEN (PSA): Primary | ICD-10-CM

## 2022-09-28 ENCOUNTER — LAB (OUTPATIENT)
Dept: LAB | Facility: HOSPITAL | Age: 67
End: 2022-09-28

## 2022-09-28 DIAGNOSIS — R97.20 ELEVATED PROSTATE SPECIFIC ANTIGEN (PSA): ICD-10-CM

## 2022-09-28 LAB
BUN SERPL-MCNC: 10 MG/DL (ref 8–23)
CREAT SERPL-MCNC: 1.07 MG/DL (ref 0.76–1.27)
EGFRCR SERPLBLD CKD-EPI 2021: 77 ML/MIN/1.73
PSA SERPL-MCNC: 5.03 NG/ML (ref 0–4)

## 2022-09-28 PROCEDURE — 84153 ASSAY OF PSA TOTAL: CPT

## 2022-09-28 PROCEDURE — 84520 ASSAY OF UREA NITROGEN: CPT

## 2022-09-28 PROCEDURE — 36415 COLL VENOUS BLD VENIPUNCTURE: CPT

## 2022-09-28 PROCEDURE — 82565 ASSAY OF CREATININE: CPT

## 2022-10-26 DIAGNOSIS — I10 ESSENTIAL HYPERTENSION: Chronic | ICD-10-CM

## 2022-10-27 RX ORDER — ASPIRIN 81 MG/1
TABLET ORAL
Qty: 30 TABLET | Refills: 3 | Status: SHIPPED | OUTPATIENT
Start: 2022-10-27 | End: 2023-03-13

## 2022-10-27 RX ORDER — OLMESARTAN MEDOXOMIL 20 MG/1
TABLET ORAL
Qty: 90 TABLET | Refills: 3 | Status: SHIPPED | OUTPATIENT
Start: 2022-10-27

## 2022-12-13 DIAGNOSIS — F51.02 ADJUSTMENT INSOMNIA: Chronic | ICD-10-CM

## 2022-12-13 RX ORDER — TRAZODONE HYDROCHLORIDE 100 MG/1
TABLET ORAL
Qty: 180 TABLET | Refills: 3 | Status: SHIPPED | OUTPATIENT
Start: 2022-12-13

## 2023-01-09 ENCOUNTER — TRANSCRIBE ORDERS (OUTPATIENT)
Dept: LAB | Facility: HOSPITAL | Age: 68
End: 2023-01-09
Payer: MEDICARE

## 2023-01-09 ENCOUNTER — LAB (OUTPATIENT)
Dept: LAB | Facility: HOSPITAL | Age: 68
End: 2023-01-09
Payer: MEDICARE

## 2023-01-09 DIAGNOSIS — R97.20 ELEVATED PSA: ICD-10-CM

## 2023-01-09 DIAGNOSIS — R97.20 ELEVATED PSA: Primary | ICD-10-CM

## 2023-01-09 LAB — PSA SERPL-MCNC: 6.51 NG/ML (ref 0–4)

## 2023-01-09 PROCEDURE — 36415 COLL VENOUS BLD VENIPUNCTURE: CPT

## 2023-01-09 PROCEDURE — 84153 ASSAY OF PSA TOTAL: CPT

## 2023-02-22 PROBLEM — R97.20 ELEVATED PSA: Status: ACTIVE | Noted: 2023-02-22

## 2023-02-23 ENCOUNTER — LAB (OUTPATIENT)
Dept: LAB | Facility: HOSPITAL | Age: 68
End: 2023-02-23
Payer: MEDICARE

## 2023-02-23 DIAGNOSIS — I10 ESSENTIAL HYPERTENSION: Chronic | ICD-10-CM

## 2023-02-23 DIAGNOSIS — R97.20 ELEVATED PSA: ICD-10-CM

## 2023-02-23 LAB
ALBUMIN SERPL-MCNC: 4.5 G/DL (ref 3.5–5.2)
ALBUMIN/GLOB SERPL: 1.8 G/DL
ALP SERPL-CCNC: 105 U/L (ref 39–117)
ALT SERPL W P-5'-P-CCNC: 28 U/L (ref 1–41)
ANION GAP SERPL CALCULATED.3IONS-SCNC: 7.8 MMOL/L (ref 5–15)
AST SERPL-CCNC: 22 U/L (ref 1–40)
BILIRUB SERPL-MCNC: 0.3 MG/DL (ref 0–1.2)
BUN SERPL-MCNC: 15 MG/DL (ref 8–23)
BUN/CREAT SERPL: 13.3 (ref 7–25)
CALCIUM SPEC-SCNC: 9.4 MG/DL (ref 8.6–10.5)
CHLORIDE SERPL-SCNC: 106 MMOL/L (ref 98–107)
CO2 SERPL-SCNC: 25.2 MMOL/L (ref 22–29)
CREAT SERPL-MCNC: 1.13 MG/DL (ref 0.76–1.27)
EGFRCR SERPLBLD CKD-EPI 2021: 71.7 ML/MIN/1.73
GLOBULIN UR ELPH-MCNC: 2.5 GM/DL
GLUCOSE SERPL-MCNC: 104 MG/DL (ref 65–99)
MAGNESIUM SERPL-MCNC: 2.2 MG/DL (ref 1.6–2.4)
POTASSIUM SERPL-SCNC: 4.2 MMOL/L (ref 3.5–5.2)
PROT SERPL-MCNC: 7 G/DL (ref 6–8.5)
PSA SERPL-MCNC: 5.42 NG/ML (ref 0–4)
SODIUM SERPL-SCNC: 139 MMOL/L (ref 136–145)

## 2023-02-23 PROCEDURE — 83735 ASSAY OF MAGNESIUM: CPT

## 2023-02-23 PROCEDURE — 80053 COMPREHEN METABOLIC PANEL: CPT

## 2023-02-23 PROCEDURE — 84153 ASSAY OF PSA TOTAL: CPT

## 2023-02-23 PROCEDURE — 36415 COLL VENOUS BLD VENIPUNCTURE: CPT

## 2023-02-24 ENCOUNTER — OFFICE VISIT (OUTPATIENT)
Dept: FAMILY MEDICINE CLINIC | Facility: CLINIC | Age: 68
End: 2023-02-24
Payer: MEDICARE

## 2023-02-24 VITALS
WEIGHT: 204.8 LBS | BODY MASS INDEX: 27.74 KG/M2 | DIASTOLIC BLOOD PRESSURE: 78 MMHG | SYSTOLIC BLOOD PRESSURE: 118 MMHG | HEIGHT: 72 IN

## 2023-02-24 DIAGNOSIS — R97.20 ELEVATED PSA: Chronic | ICD-10-CM

## 2023-02-24 DIAGNOSIS — L98.9 LESION OF SUBCUTANEOUS TISSUE: Primary | ICD-10-CM

## 2023-02-24 DIAGNOSIS — I10 ESSENTIAL HYPERTENSION: Chronic | ICD-10-CM

## 2023-02-24 DIAGNOSIS — Z00.00 MEDICARE ANNUAL WELLNESS VISIT, SUBSEQUENT: ICD-10-CM

## 2023-02-24 DIAGNOSIS — F51.02 ADJUSTMENT INSOMNIA: Chronic | ICD-10-CM

## 2023-02-24 PROCEDURE — 1126F AMNT PAIN NOTED NONE PRSNT: CPT | Performed by: FAMILY MEDICINE

## 2023-02-24 PROCEDURE — 1170F FXNL STATUS ASSESSED: CPT | Performed by: FAMILY MEDICINE

## 2023-02-24 PROCEDURE — 1159F MED LIST DOCD IN RCRD: CPT | Performed by: FAMILY MEDICINE

## 2023-02-24 PROCEDURE — 99214 OFFICE O/P EST MOD 30 MIN: CPT | Performed by: FAMILY MEDICINE

## 2023-02-24 PROCEDURE — G0439 PPPS, SUBSEQ VISIT: HCPCS | Performed by: FAMILY MEDICINE

## 2023-02-24 NOTE — PROGRESS NOTES
Subjective   Brandin Jackson is a 67 y.o. male.  Reevaluation hypertension multifactoral insomnia diagnoses below.  In the interim has not had a lipoma left lower chest wall requested general surgery consultation for removal causes irritation when exercising.  Continues to exercise regularly and watching diet and exercise.  Elevated PSA last time revealed an MRI of the prostate that was relatively benign.  Repeat PSA is 5.4.  Currently seeing urology being followed for same.  Lab work within limits.  Overall stable.  Sun-damaged skin remains about the same.    History of Present Illness   HPI    The following portions of the patient's history were reviewed and updated as appropriate: allergies, current medications, past family history, past medical history, past social history, past surgical history and problem list.    Review of Systems  Review of Systems   Constitutional: Negative for activity change, appetite change, fatigue and unexpected weight change.   HENT: Negative for trouble swallowing and voice change.    Eyes: Negative for redness and visual disturbance.   Respiratory: Negative for cough and wheezing.    Cardiovascular: Negative for chest pain and palpitations.   Gastrointestinal: Negative for abdominal pain, constipation, diarrhea, nausea and vomiting.   Genitourinary: Negative for urgency.   Musculoskeletal: Negative for joint swelling.   Neurological: Negative for syncope and headaches.   Hematological: Negative for adenopathy.   Psychiatric/Behavioral: Negative for sleep disturbance.       Objective   Physical Exam  Physical Exam  Constitutional:       Appearance: He is well-developed.   HENT:      Head: Normocephalic.   Eyes:      Pupils: Pupils are equal, round, and reactive to light.   Neck:      Thyroid: No thyromegaly.   Cardiovascular:      Rate and Rhythm: Normal rate and regular rhythm.      Heart sounds: Normal heart sounds. No murmur heard.    No friction rub. No gallop.   Pulmonary:      " Breath sounds: Normal breath sounds.   Chest:          Comments: Subcutaneous lipoma left lower chest wall about 3 to 4 cm freely mobile  Abdominal:      General: There is no distension.      Palpations: Abdomen is soft. There is no mass.      Tenderness: There is no abdominal tenderness.   Musculoskeletal:         General: Normal range of motion.      Cervical back: Normal range of motion.      Comments: No edema 2+ pulses get up and go 3-5 sec   Skin:     General: Skin is warm and dry.      Comments: [place] tanned is mention   Neurological:      Mental Status: He is alert and oriented to person, place, and time.      Deep Tendon Reflexes: Reflexes are normal and symmetric.       Results for orders placed or performed in visit on 02/23/23   Magnesium    Specimen: Blood   Result Value Ref Range    Magnesium 2.2 1.6 - 2.4 mg/dL   Comprehensive Metabolic Panel    Specimen: Blood   Result Value Ref Range    Glucose 104 (H) 65 - 99 mg/dL    BUN 15 8 - 23 mg/dL    Creatinine 1.13 0.76 - 1.27 mg/dL    Sodium 139 136 - 145 mmol/L    Potassium 4.2 3.5 - 5.2 mmol/L    Chloride 106 98 - 107 mmol/L    CO2 25.2 22.0 - 29.0 mmol/L    Calcium 9.4 8.6 - 10.5 mg/dL    Total Protein 7.0 6.0 - 8.5 g/dL    Albumin 4.5 3.5 - 5.2 g/dL    ALT (SGPT) 28 1 - 41 U/L    AST (SGOT) 22 1 - 40 U/L    Alkaline Phosphatase 105 39 - 117 U/L    Total Bilirubin 0.3 0.0 - 1.2 mg/dL    Globulin 2.5 gm/dL    A/G Ratio 1.8 g/dL    BUN/Creatinine Ratio 13.3 7.0 - 25.0    Anion Gap 7.8 5.0 - 15.0 mmol/L    eGFR 71.7 >60.0 mL/min/1.73   PSA DIAGNOSTIC    Specimen: Blood   Result Value Ref Range    PSA 5.420 (H) 0.000 - 4.000 ng/mL           Visit Vitals  /78   Ht 182.9 cm (72\")   Wt 92.9 kg (204 lb 12.8 oz)   BMI 27.78 kg/m²     Body mass index is 27.78 kg/m².  /78   Ht 182.9 cm (72\")   Wt 92.9 kg (204 lb 12.8 oz)   BMI 27.78 kg/m²       Assessment/Plan   Diagnoses and all orders for this visit:    1. Lesion of subcutaneous tissue " (Primary)  -     Ambulatory Referral to General Surgery    2. Essential hypertension    3. Elevated PSA    4. Adjustment insomnia    5. Medicare annual wellness visit, subsequent    Counseled mainly on sun exposure diet exercise continue medication.  Referral made at request.  Counseled on lifestyle issues recheck 6

## 2023-02-24 NOTE — PROGRESS NOTES
The ABCs of the Annual Wellness Visit  Subsequent Medicare Wellness Visit    Subjective      Brandin Jackson is a 67 y.o. male who presents for a Subsequent Medicare Wellness Visit.    The following portions of the patient's history were reviewed and   updated as appropriate: allergies, current medications, past family history, past medical history, past social history, past surgical history and problem list.    Compared to one year ago, the patient feels his physical   health is the same.    Compared to one year ago, the patient feels his mental   health is the same.    Recent Hospitalizations:  He was not admitted to the hospital during the last year.       Current Medical Providers:  Patient Care Team:  Jasson Alvares MD as PCP - General (Family Medicine)    Outpatient Medications Prior to Visit   Medication Sig Dispense Refill   • aspirin 81 MG EC tablet TAKE 1 TABLET BY MOUTH DAILY. 30 tablet 3   • meloxicam (MOBIC) 15 MG tablet Take 1 tablet by mouth Daily. 30 tablet 2   • olmesartan (BENICAR) 20 MG tablet TAKE 1 TABLET BY MOUTH DAILY. 90 tablet 3   • sildenafil (VIAGRA) 100 MG tablet TAKE 1/2 TO 1 TABLET BY MOUTH DAILY AS NEEDED 10 tablet 5   • traZODone (DESYREL) 100 MG tablet TAKE TWO TABLETS BY MOUTH EVERY NIGHT AT BEDTIME 180 tablet 3     No facility-administered medications prior to visit.       No opioid medication identified on active medication list. I have reviewed chart for other potential  high risk medication/s and harmful drug interactions in the elderly.          Aspirin is on active medication list. Aspirin use is indicated based on review of current medical condition/s. Pros and cons of this therapy have been discussed today. Benefits of this medication outweigh potential harm.  Patient has been encouraged to continue taking this medication.  .      Patient Active Problem List   Diagnosis   • Adjustment insomnia   • Sun-damaged skin   • Essential hypertension   • Vasculogenic erectile  "dysfunction   • Chronic bilateral low back pain without sciatica   • Elevated PSA     Advance Care Planning  Advance Directive is not on file.  ACP discussion was held with the patient during this visit. Patient does not have an advance directive, information provided.     Objective    Vitals:    23 0846   BP: 118/78   Weight: 92.9 kg (204 lb 12.8 oz)   Height: 182.9 cm (72\")   PainSc: 0-No pain     Estimated body mass index is 27.78 kg/m² as calculated from the following:    Height as of this encounter: 182.9 cm (72\").    Weight as of this encounter: 92.9 kg (204 lb 12.8 oz).    BMI is >= 25 and <30. (Overweight) The following options were offered after discussion;: exercise counseling/recommendations and nutrition counseling/recommendations      Does the patient have evidence of cognitive impairment?   No            HEALTH RISK ASSESSMENT    Smoking Status:  Social History     Tobacco Use   Smoking Status Former   • Types: Cigarettes   • Quit date: 2020   • Years since quittin.2   Smokeless Tobacco Never     Alcohol Consumption:  Social History     Substance and Sexual Activity   Alcohol Use Yes     Fall Risk Screen:    STEADI Fall Risk Assessment was completed, and patient is at LOW risk for falls.Assessment completed on:2023    Depression Screening:  PHQ-2/PHQ-9 Depression Screening 2023   Little Interest or Pleasure in Doing Things 0-->not at all   Feeling Down, Depressed or Hopeless 0-->not at all   PHQ-9: Brief Depression Severity Measure Score 0       Health Habits and Functional and Cognitive Screening:  Functional & Cognitive Status 2023   Do you have difficulty preparing food and eating? No   Do you have difficulty bathing yourself, getting dressed or grooming yourself? No   Do you have difficulty using the toilet? No   Do you have difficulty moving around from place to place? No   Do you have trouble with steps or getting out of a bed or a chair? No   Current Diet Well " Balanced Diet   Dental Exam Up to date   Eye Exam Up to date   Exercise (times per week) 5 times per week   Current Exercises Include Other   Do you need help using the phone?  No   Are you deaf or do you have serious difficulty hearing?  No   Do you need help with transportation? No   Do you need help shopping? No   Do you need help preparing meals?  No   Do you need help with housework?  No   Do you need help with laundry? No   Do you need help taking your medications? No   Do you need help managing money? No   Do you ever drive or ride in a car without wearing a seat belt? No   Have you felt unusual stress, anger or loneliness in the last month? No   Who do you live with? Alone   If you need help, do you have trouble finding someone available to you? No   Have you been bothered in the last four weeks by sexual problems? Yes   Do you have difficulty concentrating, remembering or making decisions? No       Age-appropriate Screening Schedule:  Refer to the list below for future screening recommendations based on patient's age, sex and/or medical conditions. Orders for these recommended tests are listed in the plan section. The patient has been provided with a written plan.    Health Maintenance   Topic Date Due   • ANNUAL WELLNESS VISIT  02/24/2023   • TDAP/TD VACCINES (1 - Tdap) 08/09/2024 (Originally 11/4/1974)   • ZOSTER VACCINE (1 of 2) 03/20/2026 (Originally 11/4/2006)   • Pneumococcal Vaccine 65+ (3) 02/24/2026   • COLORECTAL CANCER SCREENING  07/30/2030   • HEPATITIS C SCREENING  Completed   • COVID-19 Vaccine  Completed   • INFLUENZA VACCINE  Completed   • AAA SCREEN (ONE-TIME)  Completed                CMS Preventative Services Quick Reference  Risk Factors Identified During Encounter:    Immunizations Discussed/Encouraged: Influenza and COVID19    The above risks/problems have been discussed with the patient.  Pertinent information has been shared with the patient in the After Visit Summary.    Diagnoses  and all orders for this visit:    1. Lesion of subcutaneous tissue (Primary)  -     Ambulatory Referral to General Surgery    2. Essential hypertension    3. Elevated PSA    4. Adjustment insomnia    5. Medicare annual wellness visit, subsequent        Follow Up:   Next Medicare Wellness visit to be scheduled in 1 year.      An After Visit Summary and PPPS were made available to the patient.           Picato Counseling:  I discussed with the patient the risks of Picato including but not limited to erythema, scaling, itching, weeping, crusting, and pain.

## 2023-02-27 ENCOUNTER — OFFICE VISIT (OUTPATIENT)
Dept: SURGERY | Facility: CLINIC | Age: 68
End: 2023-02-27
Payer: MEDICARE

## 2023-02-27 VITALS
BODY MASS INDEX: 27.36 KG/M2 | TEMPERATURE: 97.4 F | DIASTOLIC BLOOD PRESSURE: 69 MMHG | WEIGHT: 202 LBS | OXYGEN SATURATION: 98 % | HEART RATE: 70 BPM | HEIGHT: 72 IN | SYSTOLIC BLOOD PRESSURE: 110 MMHG

## 2023-02-27 DIAGNOSIS — D17.1 LIPOMA OF CHEST WALL: Primary | ICD-10-CM

## 2023-02-27 PROCEDURE — 99203 OFFICE O/P NEW LOW 30 MIN: CPT | Performed by: STUDENT IN AN ORGANIZED HEALTH CARE EDUCATION/TRAINING PROGRAM

## 2023-02-27 NOTE — PROGRESS NOTES
Subjective   Brandin Jackson is a 67 y.o. male     Chief Complaint: Lipoma of chest wall    History of Present Illness  66 yo gentleman with limpoma of the chest wall. He presents today to discuss resection. No pain, just aggarvating.        Review of Systems   Constitutional: Negative.    HENT: Positive for congestion.    Eyes: Negative.    Respiratory: Positive for cough.    Cardiovascular: Negative.    Gastrointestinal: Negative.    Endocrine: Positive for cold intolerance.   Genitourinary: Negative.    Musculoskeletal: Positive for back pain.   Skin: Negative.    Allergic/Immunologic: Negative.    Neurological: Negative.    Hematological: Bruises/bleeds easily.   Psychiatric/Behavioral: Negative.      Past Medical History:   Diagnosis Date   • Hypertension      Past Surgical History:   Procedure Laterality Date   • KIDNEY STONE SURGERY     • TOTAL KNEE ARTHROPLASTY Left 2017     Family History   Problem Relation Age of Onset   • Stroke Mother      Social History     Socioeconomic History   • Marital status:    Tobacco Use   • Smoking status: Former     Packs/day: 1.00     Years: 15.00     Pack years: 15.00     Types: Cigarettes     Quit date: 2020     Years since quittin.2   • Smokeless tobacco: Never   Vaping Use   • Vaping Use: Never used   Substance and Sexual Activity   • Alcohol use: Yes   • Drug use: No   • Sexual activity: Yes     Allergies   Allergen Reactions   • Penicillins Swelling     Itching     Vitals:    23 1425   BP: 110/69   Pulse: 70   Temp: 97.4 °F (36.3 °C)   SpO2: 98%       Home Medications:  Prior to Admission medications    Medication Sig Start Date End Date Taking? Authorizing Provider   olmesartan (BENICAR) 20 MG tablet TAKE 1 TABLET BY MOUTH DAILY. 10/27/22  Yes Jasson Alvares MD   traZODone (DESYREL) 100 MG tablet TAKE TWO TABLETS BY MOUTH  EVERY NIGHT AT BEDTIME 12/13/22  Yes Jassno Alvares MD   aspirin 81 MG EC tablet TAKE 1 TABLET BY MOUTH DAILY. 10/27/22   Jasson Alvares MD   meloxicam (MOBIC) 15 MG tablet Take 1 tablet by mouth Daily. 1/12/22   Ben Judge, MANJINDER   sildenafil (VIAGRA) 100 MG tablet TAKE 1/2 TO 1 TABLET BY MOUTH DAILY AS NEEDED 2/24/22   Jasson Alvares MD       Objective   Physical Exam  Constitutional:       Appearance: Normal appearance.   HENT:      Head: Normocephalic and atraumatic.      Nose: Nose normal. No congestion.      Mouth/Throat:      Mouth: Mucous membranes are moist.      Pharynx: Oropharynx is clear.   Eyes:      General: No scleral icterus.     Pupils: Pupils are equal, round, and reactive to light.   Cardiovascular:      Rate and Rhythm: Normal rate and regular rhythm.   Pulmonary:      Effort: Pulmonary effort is normal. No respiratory distress.   Skin:     General: Skin is warm and dry.      Comments: Mobile soft 4cm mass in the middle of chest   Neurological:      General: No focal deficit present.      Mental Status: He is alert and oriented to person, place, and time.   Psychiatric:         Mood and Affect: Mood normal.         Behavior: Behavior normal.         Assessment & Plan       The encounter diagnosis was Lipoma of chest wall.      66 yo gentleman with chest wall lipoma    - will plan on chest wall mass excsicion in office.  I have counseled the patient about the nature of the problem, the natural history of the disease, the risk and benefits of surgery, the expected recovery, and the alternatives to surgery.  After this discussion, they were given an opportunity to ask questions, have an understanding of diagnosis and treatment options, and wish to proceed with surgery.                  This document has been electronically signed by Christian Goldman MD on February 27, 2023 14:37 CST

## 2023-02-27 NOTE — PROGRESS NOTES
Subjective   Brandin Jackson is a 67 y.o. male     Chief Complaint: Lipoma of chest wall    History of Present Illness  68 yo gentleman with limpoma of the chest wall. He presents today to discuss resection. No pain, just aggarvating.        Review of Systems   Constitutional: Negative.    HENT: Positive for congestion.    Eyes: Negative.    Respiratory: Positive for cough.    Cardiovascular: Negative.    Gastrointestinal: Negative.    Endocrine: Positive for cold intolerance.   Genitourinary: Negative.    Musculoskeletal: Positive for back pain.   Skin: Negative.    Allergic/Immunologic: Negative.    Neurological: Negative.    Hematological: Bruises/bleeds easily.   Psychiatric/Behavioral: Negative.      Past Medical History:   Diagnosis Date   • Hypertension      Past Surgical History:   Procedure Laterality Date   • KIDNEY STONE SURGERY     • TOTAL KNEE ARTHROPLASTY Left 2017     Family History   Problem Relation Age of Onset   • Stroke Mother      Social History     Socioeconomic History   • Marital status:    Tobacco Use   • Smoking status: Former     Packs/day: 1.00     Years: 15.00     Pack years: 15.00     Types: Cigarettes     Quit date: 2020     Years since quittin.2   • Smokeless tobacco: Never   Vaping Use   • Vaping Use: Never used   Substance and Sexual Activity   • Alcohol use: Yes   • Drug use: No   • Sexual activity: Yes     Allergies   Allergen Reactions   • Penicillins Swelling     Itching     Vitals:    23 1425   BP: 110/69   Pulse: 70   Temp: 97.4 °F (36.3 °C)   SpO2: 98%       Home Medications:  Prior to Admission medications    Medication Sig Start Date End Date Taking? Authorizing Provider   olmesartan (BENICAR) 20 MG tablet TAKE 1 TABLET BY MOUTH DAILY. 10/27/22  Yes Jasson Alvares MD   traZODone (DESYREL) 100 MG tablet TAKE TWO TABLETS BY MOUTH  EVERY NIGHT AT BEDTIME 12/13/22  Yes Jasson Alvares MD   aspirin 81 MG EC tablet TAKE 1 TABLET BY MOUTH DAILY. 10/27/22   Jasson Alvares MD   meloxicam (MOBIC) 15 MG tablet Take 1 tablet by mouth Daily. 1/12/22   Ben Judge DPM   sildenafil (VIAGRA) 100 MG tablet TAKE 1/2 TO 1 TABLET BY MOUTH DAILY AS NEEDED 2/24/22   Jasson Alvares MD       Objective   Physical Exam  Constitutional:       Appearance: Normal appearance.   HENT:      Head: Normocephalic and atraumatic.      Nose: Nose normal. No congestion.      Mouth/Throat:      Mouth: Mucous membranes are moist.      Pharynx: Oropharynx is clear.   Eyes:      General: No scleral icterus.     Pupils: Pupils are equal, round, and reactive to light.   Cardiovascular:      Rate and Rhythm: Normal rate and regular rhythm.   Pulmonary:      Effort: Pulmonary effort is normal. No respiratory distress.   Skin:     General: Skin is warm and dry.      Comments: Mobile soft 4cm mass in the middle of chest   Neurological:      General: No focal deficit present.      Mental Status: He is alert and oriented to person, place, and time.   Psychiatric:         Mood and Affect: Mood normal.         Behavior: Behavior normal.         Assessment & Plan       There were no encounter diagnoses.                     This document has been electronically signed by Hailey Ibrahim MA on February 27, 2023 14:30 CST

## 2023-03-02 ENCOUNTER — PROCEDURE VISIT (OUTPATIENT)
Dept: SURGERY | Facility: CLINIC | Age: 68
End: 2023-03-02
Payer: MEDICARE

## 2023-03-02 VITALS
HEART RATE: 97 BPM | TEMPERATURE: 97.1 F | SYSTOLIC BLOOD PRESSURE: 110 MMHG | WEIGHT: 205.8 LBS | BODY MASS INDEX: 27.87 KG/M2 | DIASTOLIC BLOOD PRESSURE: 78 MMHG | HEIGHT: 72 IN | OXYGEN SATURATION: 98 %

## 2023-03-02 DIAGNOSIS — D17.1 LIPOMA OF CHEST WALL: Primary | ICD-10-CM

## 2023-03-02 PROCEDURE — 88304 TISSUE EXAM BY PATHOLOGIST: CPT

## 2023-03-02 PROCEDURE — 12032 INTMD RPR S/A/T/EXT 2.6-7.5: CPT | Performed by: STUDENT IN AN ORGANIZED HEALTH CARE EDUCATION/TRAINING PROGRAM

## 2023-03-02 PROCEDURE — 11406 EXC TR-EXT B9+MARG >4.0 CM: CPT | Performed by: STUDENT IN AN ORGANIZED HEALTH CARE EDUCATION/TRAINING PROGRAM

## 2023-03-02 NOTE — PROGRESS NOTES
Procedure     Procedure Performed: Removal of 4.5cm lipoma from the chest  Preop Dx: Lipoma  Postop Dx: Same  Assistant: Thea Abdul  EBL: <5cc  Specimen: 4.5cm Lipoma     Note: Site was identified by the patient.  Consent was obtained.  Timeout was performed.  The area was prepped and draped in normal sterile fashion.  Local anesthetic was then injected in a field block.  A transverse incision was made over the lesion. The lipoma was grasped with ruth clamps and carefully delivered through the incision. The cavity was irrigated with saline. The wound was then closed in two layers with 3-0 vicryl and 4-0 Monocryl.               This document has been electronically signed by Christian Goldman MD on March 2, 2023 14:10 CST

## 2023-03-06 LAB — REF LAB TEST METHOD: NORMAL

## 2023-03-13 DIAGNOSIS — N52.9 VASCULOGENIC ERECTILE DYSFUNCTION, UNSPECIFIED VASCULOGENIC ERECTILE DYSFUNCTION TYPE: Chronic | ICD-10-CM

## 2023-03-13 RX ORDER — SILDENAFIL 100 MG/1
TABLET, FILM COATED ORAL
Qty: 10 TABLET | Refills: 5 | Status: SHIPPED | OUTPATIENT
Start: 2023-03-13

## 2023-03-13 RX ORDER — ASPIRIN 81 MG/1
TABLET, COATED ORAL
Qty: 30 TABLET | Refills: 3 | Status: SHIPPED | OUTPATIENT
Start: 2023-03-13

## 2023-08-17 ENCOUNTER — TRANSCRIBE ORDERS (OUTPATIENT)
Dept: LAB | Facility: HOSPITAL | Age: 68
End: 2023-08-17
Payer: MEDICARE

## 2023-08-17 ENCOUNTER — LAB (OUTPATIENT)
Dept: LAB | Facility: HOSPITAL | Age: 68
End: 2023-08-17
Payer: MEDICARE

## 2023-08-17 DIAGNOSIS — N40.1 BPH WITH URINARY OBSTRUCTION: Primary | ICD-10-CM

## 2023-08-17 DIAGNOSIS — N13.8 BPH WITH URINARY OBSTRUCTION: Primary | ICD-10-CM

## 2023-08-17 DIAGNOSIS — N40.1 BPH WITH URINARY OBSTRUCTION: ICD-10-CM

## 2023-08-17 DIAGNOSIS — N13.8 BPH WITH URINARY OBSTRUCTION: ICD-10-CM

## 2023-08-17 LAB — PSA SERPL-MCNC: 4.84 NG/ML (ref 0–4)

## 2023-08-17 PROCEDURE — 36415 COLL VENOUS BLD VENIPUNCTURE: CPT

## 2023-08-17 PROCEDURE — 84153 ASSAY OF PSA TOTAL: CPT

## 2023-08-21 DIAGNOSIS — Z12.11 SCREEN FOR COLON CANCER: Primary | ICD-10-CM

## 2023-08-23 ENCOUNTER — OFFICE VISIT (OUTPATIENT)
Dept: FAMILY MEDICINE CLINIC | Facility: CLINIC | Age: 68
End: 2023-08-23
Payer: MEDICARE

## 2023-08-23 VITALS
SYSTOLIC BLOOD PRESSURE: 124 MMHG | OXYGEN SATURATION: 99 % | HEART RATE: 80 BPM | DIASTOLIC BLOOD PRESSURE: 78 MMHG | WEIGHT: 192.8 LBS | BODY MASS INDEX: 26.11 KG/M2 | HEIGHT: 72 IN

## 2023-08-23 DIAGNOSIS — R97.20 ELEVATED PSA: Chronic | ICD-10-CM

## 2023-08-23 DIAGNOSIS — I10 ESSENTIAL HYPERTENSION: Primary | Chronic | ICD-10-CM

## 2023-08-23 DIAGNOSIS — N52.9 VASCULOGENIC ERECTILE DYSFUNCTION, UNSPECIFIED VASCULOGENIC ERECTILE DYSFUNCTION TYPE: Chronic | ICD-10-CM

## 2023-08-23 DIAGNOSIS — F51.02 ADJUSTMENT INSOMNIA: Chronic | ICD-10-CM

## 2023-08-23 DIAGNOSIS — Z13.220 SCREENING CHOLESTEROL LEVEL: ICD-10-CM

## 2023-08-23 PROCEDURE — 3078F DIAST BP <80 MM HG: CPT | Performed by: FAMILY MEDICINE

## 2023-08-23 PROCEDURE — 99214 OFFICE O/P EST MOD 30 MIN: CPT | Performed by: FAMILY MEDICINE

## 2023-08-23 PROCEDURE — 3074F SYST BP LT 130 MM HG: CPT | Performed by: FAMILY MEDICINE

## 2023-08-23 RX ORDER — DUTASTERIDE 0.5 MG/1
0.5 CAPSULE, LIQUID FILLED ORAL DAILY
COMMUNITY

## 2023-08-23 RX ORDER — OLMESARTAN MEDOXOMIL 20 MG/1
20 TABLET ORAL DAILY
Qty: 90 TABLET | Refills: 3 | Status: SHIPPED | OUTPATIENT
Start: 2023-08-23

## 2023-08-23 NOTE — PROGRESS NOTES
Subjective   Brandin Jackson is a 67 y.o. male.  Reevaluation hypertension elevated PSA history of adjustment insomnia diagnoses below.  In the interim is lost weight electively.  Counseled holding at the current weight.  Feels well and is continue to be over exposed to the sun we have counseled on same does use multiple dose sunblock.  Has seen urology in regards to his PSA running between 5 and 7 and is now on Avodart with no symptoms.  Feels well otherwise.  Is up-to-date on all other.  Spent some time today counseling on vaccines.    History of Present Illness   HPI    The following portions of the patient's history were reviewed and updated as appropriate: allergies, current medications, past family history, past medical history, past social history, past surgical history, and problem list.    Review of Systems  Review of Systems   Constitutional:  Negative for activity change, appetite change, fatigue and unexpected weight change.   HENT:  Negative for trouble swallowing and voice change.    Eyes:  Negative for redness and visual disturbance.   Respiratory:  Negative for cough and wheezing.    Cardiovascular:  Negative for chest pain and palpitations.   Gastrointestinal:  Negative for abdominal pain, constipation, diarrhea, nausea and vomiting.   Genitourinary:  Negative for urgency.   Musculoskeletal:  Negative for joint swelling.   Neurological:  Negative for syncope and headaches.   Hematological:  Negative for adenopathy.   Psychiatric/Behavioral:  Negative for sleep disturbance.      Objective   Physical Exam  Physical Exam  Constitutional:       Appearance: He is well-developed.   HENT:      Head: Normocephalic.   Eyes:      Pupils: Pupils are equal, round, and reactive to light.   Neck:      Thyroid: No thyromegaly.   Cardiovascular:      Rate and Rhythm: Normal rate and regular rhythm.      Heart sounds: Normal heart sounds. No murmur heard.    No friction rub. No gallop.   Pulmonary:      Breath  "sounds: Normal breath sounds.   Abdominal:      General: There is no distension.      Palpations: Abdomen is soft. There is no mass.      Tenderness: There is no abdominal tenderness.   Musculoskeletal:         General: Normal range of motion.      Cervical back: Normal range of motion.      Comments: No peripheral edema 2+ pulses get up and go 3 to 5-second   Skin:     General: Skin is warm and dry.      Comments: Brethren tanned no new lesions   Neurological:      Mental Status: He is alert and oriented to person, place, and time.      Deep Tendon Reflexes: Reflexes are normal and symmetric.   Psychiatric:         Attention and Perception: Attention normal.         Mood and Affect: Mood normal.         Speech: Speech normal.         Behavior: Behavior normal.         Thought Content: Thought content normal.         Cognition and Memory: Cognition normal.         Judgment: Judgment normal.         Visit Vitals  /78   Pulse 80   Ht 182.9 cm (72\")   Wt 87.5 kg (192 lb 12.8 oz)   SpO2 99%   BMI 26.15 kg/mý     Body mass index is 26.15 kg/mý.  /78   Pulse 80   Ht 182.9 cm (72\")   Wt 87.5 kg (192 lb 12.8 oz)   SpO2 99%   BMI 26.15 kg/mý       Assessment/Plan   Diagnoses and all orders for this visit:    1. Essential hypertension (Primary)  -     Comprehensive Metabolic Panel; Future  -     Magnesium; Future  -     olmesartan (BENICAR) 20 MG tablet; Take 1 tablet by mouth Daily.  Dispense: 90 tablet; Refill: 3    2. Elevated PSA    3. Vasculogenic erectile dysfunction, unspecified vasculogenic erectile dysfunction type  -     CBC (No Diff); Future    4. Adjustment insomnia  -     CBC (No Diff); Future    5. Screening cholesterol level  -     Lipid Panel With LDL / HDL Ratio; Future    Counseled on sunblock counseled on fall prevention hydration vaccines in the fall.  Continue medicines as outlined.  Continue medicine for sleep at night.  Recheck 6 months with lab prior will also be time for subsequent " Medicare

## 2023-09-12 DIAGNOSIS — R19.5 POSITIVE COLORECTAL CANCER SCREENING USING COLOGUARD TEST: Primary | ICD-10-CM

## 2023-09-20 ENCOUNTER — PREP FOR SURGERY (OUTPATIENT)
Dept: OTHER | Facility: HOSPITAL | Age: 68
End: 2023-09-20
Payer: MEDICARE

## 2023-09-20 DIAGNOSIS — Z12.11 ENCOUNTER FOR SCREENING FOR MALIGNANT NEOPLASM OF COLON: Primary | ICD-10-CM

## 2023-09-20 DIAGNOSIS — R19.5 POSITIVE COLORECTAL CANCER SCREENING USING COLOGUARD TEST: ICD-10-CM

## 2023-09-20 RX ORDER — DEXTROSE AND SODIUM CHLORIDE 5; .45 G/100ML; G/100ML
30 INJECTION, SOLUTION INTRAVENOUS CONTINUOUS PRN
OUTPATIENT
Start: 2023-09-20